# Patient Record
Sex: FEMALE | Race: AMERICAN INDIAN OR ALASKA NATIVE | NOT HISPANIC OR LATINO | URBAN - METROPOLITAN AREA
[De-identification: names, ages, dates, MRNs, and addresses within clinical notes are randomized per-mention and may not be internally consistent; named-entity substitution may affect disease eponyms.]

---

## 2017-01-20 ENCOUNTER — EMERGENCY (EMERGENCY)
Facility: HOSPITAL | Age: 22
LOS: 0 days | Discharge: HOME | End: 2017-01-20

## 2017-06-27 DIAGNOSIS — K29.70 GASTRITIS, UNSPECIFIED, WITHOUT BLEEDING: ICD-10-CM

## 2017-06-27 DIAGNOSIS — Z88.1 ALLERGY STATUS TO OTHER ANTIBIOTIC AGENTS STATUS: ICD-10-CM

## 2017-06-27 DIAGNOSIS — R10.84 GENERALIZED ABDOMINAL PAIN: ICD-10-CM

## 2017-06-27 DIAGNOSIS — Z88.0 ALLERGY STATUS TO PENICILLIN: ICD-10-CM

## 2019-03-25 ENCOUNTER — INPATIENT (INPATIENT)
Facility: HOSPITAL | Age: 24
LOS: 2 days | Discharge: HOME | End: 2019-03-28
Attending: PSYCHIATRY & NEUROLOGY | Admitting: PSYCHIATRY & NEUROLOGY

## 2019-03-25 VITALS
TEMPERATURE: 97 F | SYSTOLIC BLOOD PRESSURE: 138 MMHG | HEART RATE: 75 BPM | WEIGHT: 95.02 LBS | RESPIRATION RATE: 18 BRPM | DIASTOLIC BLOOD PRESSURE: 74 MMHG | OXYGEN SATURATION: 99 %

## 2019-03-25 DIAGNOSIS — R45.851 SUICIDAL IDEATIONS: ICD-10-CM

## 2019-03-25 DIAGNOSIS — F33.1 MAJOR DEPRESSIVE DISORDER, RECURRENT, MODERATE: ICD-10-CM

## 2019-03-25 DIAGNOSIS — F41.9 ANXIETY DISORDER, UNSPECIFIED: ICD-10-CM

## 2019-03-25 LAB
ALBUMIN SERPL ELPH-MCNC: 4.5 G/DL — SIGNIFICANT CHANGE UP (ref 3.5–5.2)
ALP SERPL-CCNC: 47 U/L — SIGNIFICANT CHANGE UP (ref 30–115)
ALT FLD-CCNC: 27 U/L — SIGNIFICANT CHANGE UP (ref 0–41)
AMPHET UR-MCNC: NEGATIVE — SIGNIFICANT CHANGE UP
ANION GAP SERPL CALC-SCNC: 13 MMOL/L — SIGNIFICANT CHANGE UP (ref 7–14)
APAP SERPL-MCNC: <5 UG/ML — LOW (ref 10–30)
AST SERPL-CCNC: 25 U/L — SIGNIFICANT CHANGE UP (ref 0–41)
BARBITURATES UR SCN-MCNC: NEGATIVE — SIGNIFICANT CHANGE UP
BASOPHILS # BLD AUTO: 0.05 K/UL — SIGNIFICANT CHANGE UP (ref 0–0.2)
BASOPHILS NFR BLD AUTO: 1 % — SIGNIFICANT CHANGE UP (ref 0–1)
BENZODIAZ UR-MCNC: NEGATIVE — SIGNIFICANT CHANGE UP
BILIRUB SERPL-MCNC: 0.3 MG/DL — SIGNIFICANT CHANGE UP (ref 0.2–1.2)
BUN SERPL-MCNC: 10 MG/DL — SIGNIFICANT CHANGE UP (ref 10–20)
CALCIUM SERPL-MCNC: 9.3 MG/DL — SIGNIFICANT CHANGE UP (ref 8.5–10.1)
CHLORIDE SERPL-SCNC: 101 MMOL/L — SIGNIFICANT CHANGE UP (ref 98–110)
CO2 SERPL-SCNC: 22 MMOL/L — SIGNIFICANT CHANGE UP (ref 17–32)
COCAINE METAB.OTHER UR-MCNC: NEGATIVE — SIGNIFICANT CHANGE UP
CREAT SERPL-MCNC: 0.6 MG/DL — LOW (ref 0.7–1.5)
EOSINOPHIL # BLD AUTO: 0.53 K/UL — SIGNIFICANT CHANGE UP (ref 0–0.7)
EOSINOPHIL NFR BLD AUTO: 11.1 % — HIGH (ref 0–8)
ETHANOL SERPL-MCNC: <10 MG/DL — HIGH
FLU A RESULT: NEGATIVE — SIGNIFICANT CHANGE UP
FLU A RESULT: NEGATIVE — SIGNIFICANT CHANGE UP
FLUAV AG NPH QL: NEGATIVE — SIGNIFICANT CHANGE UP
FLUBV AG NPH QL: NEGATIVE — SIGNIFICANT CHANGE UP
GLUCOSE SERPL-MCNC: 93 MG/DL — SIGNIFICANT CHANGE UP (ref 70–99)
HCG SERPL QL: NEGATIVE — SIGNIFICANT CHANGE UP
HCT VFR BLD CALC: 40.9 % — SIGNIFICANT CHANGE UP (ref 37–47)
HGB BLD-MCNC: 14.1 G/DL — SIGNIFICANT CHANGE UP (ref 12–16)
IMM GRANULOCYTES NFR BLD AUTO: 0.2 % — SIGNIFICANT CHANGE UP (ref 0.1–0.3)
LYMPHOCYTES # BLD AUTO: 2.26 K/UL — SIGNIFICANT CHANGE UP (ref 1.2–3.4)
LYMPHOCYTES # BLD AUTO: 47.2 % — SIGNIFICANT CHANGE UP (ref 20.5–51.1)
MCHC RBC-ENTMCNC: 31 PG — SIGNIFICANT CHANGE UP (ref 27–31)
MCHC RBC-ENTMCNC: 34.5 G/DL — SIGNIFICANT CHANGE UP (ref 32–37)
MCV RBC AUTO: 89.9 FL — SIGNIFICANT CHANGE UP (ref 81–99)
METHADONE UR-MCNC: NEGATIVE — SIGNIFICANT CHANGE UP
MONOCYTES # BLD AUTO: 0.55 K/UL — SIGNIFICANT CHANGE UP (ref 0.1–0.6)
MONOCYTES NFR BLD AUTO: 11.5 % — HIGH (ref 1.7–9.3)
NEUTROPHILS # BLD AUTO: 1.39 K/UL — LOW (ref 1.4–6.5)
NEUTROPHILS NFR BLD AUTO: 29 % — LOW (ref 42.2–75.2)
NRBC # BLD: 0 /100 WBCS — SIGNIFICANT CHANGE UP (ref 0–0)
OPIATES UR-MCNC: NEGATIVE — SIGNIFICANT CHANGE UP
PCP SPEC-MCNC: SIGNIFICANT CHANGE UP
PLATELET # BLD AUTO: 337 K/UL — SIGNIFICANT CHANGE UP (ref 130–400)
POTASSIUM SERPL-MCNC: 3.6 MMOL/L — SIGNIFICANT CHANGE UP (ref 3.5–5)
POTASSIUM SERPL-SCNC: 3.6 MMOL/L — SIGNIFICANT CHANGE UP (ref 3.5–5)
PROPOXYPHENE QUALITATIVE URINE RESULT: NEGATIVE — SIGNIFICANT CHANGE UP
PROT SERPL-MCNC: 7.5 G/DL — SIGNIFICANT CHANGE UP (ref 6–8)
RBC # BLD: 4.55 M/UL — SIGNIFICANT CHANGE UP (ref 4.2–5.4)
RBC # FLD: 11.9 % — SIGNIFICANT CHANGE UP (ref 11.5–14.5)
RSV RESULT: NEGATIVE — SIGNIFICANT CHANGE UP
RSV RNA RESP QL NAA+PROBE: NEGATIVE — SIGNIFICANT CHANGE UP
SALICYLATES SERPL-MCNC: <0.3 MG/DL — LOW (ref 4–30)
SODIUM SERPL-SCNC: 136 MMOL/L — SIGNIFICANT CHANGE UP (ref 135–146)
WBC # BLD: 4.79 K/UL — LOW (ref 4.8–10.8)
WBC # FLD AUTO: 4.79 K/UL — LOW (ref 4.8–10.8)

## 2019-03-25 RX ORDER — BENZOCAINE AND MENTHOL 5; 1 G/100ML; G/100ML
1 LIQUID ORAL THREE TIMES A DAY
Qty: 0 | Refills: 0 | Status: DISCONTINUED | OUTPATIENT
Start: 2019-03-25 | End: 2019-03-28

## 2019-03-25 RX ORDER — IBUPROFEN 200 MG
400 TABLET ORAL THREE TIMES A DAY
Qty: 0 | Refills: 0 | Status: DISCONTINUED | OUTPATIENT
Start: 2019-03-25 | End: 2019-03-28

## 2019-03-25 RX ORDER — ACETAMINOPHEN 500 MG
650 TABLET ORAL EVERY 6 HOURS
Qty: 0 | Refills: 0 | Status: DISCONTINUED | OUTPATIENT
Start: 2019-03-25 | End: 2019-03-28

## 2019-03-25 RX ORDER — LANOLIN ALCOHOL/MO/W.PET/CERES
3 CREAM (GRAM) TOPICAL AT BEDTIME
Qty: 0 | Refills: 0 | Status: DISCONTINUED | OUTPATIENT
Start: 2019-03-25 | End: 2019-03-28

## 2019-03-25 RX ORDER — HYDROXYZINE HCL 10 MG
25 TABLET ORAL EVERY 6 HOURS
Qty: 0 | Refills: 0 | Status: DISCONTINUED | OUTPATIENT
Start: 2019-03-25 | End: 2019-03-28

## 2019-03-25 RX ORDER — BENZOCAINE AND MENTHOL 5; 1 G/100ML; G/100ML
1 LIQUID ORAL ONCE
Qty: 0 | Refills: 0 | Status: COMPLETED | OUTPATIENT
Start: 2019-03-25 | End: 2019-03-25

## 2019-03-25 RX ADMIN — Medication 200 MILLIGRAM(S): at 03:48

## 2019-03-25 RX ADMIN — Medication 3 MILLIGRAM(S): at 21:08

## 2019-03-25 RX ADMIN — Medication 25 MILLIGRAM(S): at 21:08

## 2019-03-25 RX ADMIN — BENZOCAINE AND MENTHOL 1 LOZENGE: 5; 1 LIQUID ORAL at 16:07

## 2019-03-25 RX ADMIN — BENZOCAINE AND MENTHOL 1 LOZENGE: 5; 1 LIQUID ORAL at 08:54

## 2019-03-25 NOTE — ED BEHAVIORAL HEALTH ASSESSMENT NOTE - OTHER
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

## 2019-03-25 NOTE — ED PROVIDER NOTE - CLINICAL SUMMARY MEDICAL DECISION MAKING FREE TEXT BOX
pt with no significant PMH here with SI, this is a recurrence of sx. pt seen by psychaitry and admitted after medical clearance
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
## 2019-03-25 NOTE — ED BEHAVIORAL HEALTH ASSESSMENT NOTE - DESCRIPTION
Patient placed on 1:1, evaluated by ED provider. Friend at bedside. none raised by parents with 2 sisters - no relationship with any, domiciled alone in home owned by parents who visit occasionally. close with friends. laid off from work in [employer] design 3 mo ago. 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

## 2019-03-25 NOTE — ED BEHAVIORAL HEALTH ASSESSMENT NOTE - RISK ASSESSMENT
Patient's suicide risk factors of multiple suicide attempts, recent suicidal ideation, feelings of guilt, living alone, and no current treatment is not currently mitigated by her protective factors. Patient is currently an elevated suicide risk, appropriate for inpatient management for stabilization.

## 2019-03-25 NOTE — ED BEHAVIORAL HEALTH ASSESSMENT NOTE - OTHER PAST PSYCHIATRIC HISTORY (INCLUDE DETAILS REGARDING ONSET, COURSE OF ILLNESS, INPATIENT/OUTPATIENT TREATMENT)
3prior IPP admissions, last 3 years ago following suicide attempt. patient diagnosed 4-5 years ago with PTSD, DIAZ, MDD. Did not continue treatment after moving back to [place] because she reports that the environment was better. No current outpatient treatment.

## 2019-03-25 NOTE — H&P ADULT - NSHPPHYSICALEXAM_GEN_ALL_CORE
GENERAL:  22y/o Female NAD, resting comfortably.  HEAD:  Atraumatic, Normocephalic  EYES: EOMI, PERRLA, conjunctiva and sclera clear  NECK: Supple, No JVD, no cervical lymphadenopathy, non-tender  CHEST/LUNG: Clear to auscultation bilaterally; No wheeze, rhonchi, or rales  HEART: Regular rate and rhythm; S1&S2  ABDOMEN: Soft, Nontender, Nondistended x 4 quadrants; Bowel sounds present  EXTREMITIES:   Peripheral Pulses Present, No clubbing, no cyanosis, or no edema, no calf tenderness  PSYCH: AAOx3, cooperative, appropriate  NEUROLOGY: WNL  SKIN: WNL

## 2019-03-25 NOTE — ED PROVIDER NOTE - NS ED ROS FT
Constitutional:  + fevers, no chills, no malaise  Eyes:  No visual changes  ENMT:see hpi  Cardiac:  No chest pain  Respiratory:  No cough or sob  GI:  No nausea, vomiting, diarrhea or abdominal pain.  :  No dysuria, frequency or burning.  MS:  No back pain, no joint pain.  Neuro:  No headache, no dizziness, no change in mental status  Skin:  No skin rash  Except as documented in the HPI,  all other systems are negative
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
## 2019-03-25 NOTE — ED BEHAVIORAL HEALTH ASSESSMENT NOTE - CASE SUMMARY
23 year old woman with a prior pscyhiatric history notable for prior acts of self-harm and multiple past diagnoses with unclear psychopharm history who presents with recent suicidal ideation and though she lacks current intent, the patient reports not feeling that she is safe given her history on impulsively acting on such thoughts.  The patient would benefit from 9.13 admission for safety and treatment planning.  she is currently not in treatment.

## 2019-03-25 NOTE — ED BEHAVIORAL HEALTH ASSESSMENT NOTE - SUMMARY
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 Patient presenting for worsening suicidal ideation in the context of no current mental health treatment and multiple psychosocial stressors - interpersonal relationships, unemployment, traumatic hx.  On exam, patient appears to exhibit signs consistent with a major depressive episode with suicidal ideation, guilt, and circumstantial thought process. It's unclear if history of irritability is associated with bpad or the result of maladaptive coping during an adjustment episode. She appears to be a chronic risk for self-injurious/suicidal behavior, with acute elevation in the setting of the above stressors and is unable to contract for safety at this time. Her current presentation is appropriate for voluntary IPP admission where she will likely benefit from continued observation, psychoeducation and a medication trial. She will also likely be a good candidate for the crisis survival skills group and subsequent DBT following discharge.
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
## 2019-03-25 NOTE — ED ADULT TRIAGE NOTE - CHIEF COMPLAINT QUOTE
pt states she is having suicidal ideations, she wants to hang herself and does not feel safe at home alone  pt denies any drug/alcohol use  1:1 care initiated in triage
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
## 2019-03-25 NOTE — ED ADULT NURSE NOTE - NSIMPLEMENTINTERV_GEN_ALL_ED
Implemented All Universal Safety Interventions:  [place] to call system. Call bell, personal items and telephone within reach. Instruct patient to call for assistance. Room bathroom lighting operational. Non-slip footwear when patient is off stretcher. Physically safe environment: no spills, clutter or unnecessary equipment. Stretcher in lowest position, wheels locked, appropriate side rails in place.

## 2019-03-25 NOTE — H&P ADULT - ASSESSMENT
)	24yo single female, domiciled, and works for commission as performer and  with current URI and reported pph of DIAZ, PTSD, and MDD with 14 reported suicide attempts, most recent 3 years ago, and no current substance use. Patient presenting for worsening suicidal ideation in the context of no current mental health treatment and social stressors related to friends.  Patient reports that her mental health has been well since her last suicide attempt, after returning to [place] from [place] where she endured trauma. She reports that 5 months ago she began to provide substantial emotional support for a friend experiencing interpersonal turmoil and subsequently became less motivated to care for herself over the past 3 months. She also endorses decreased concentration and energy and feeling of guilt about her lack of self care. She reports that she started having fleeting suicidal ideation 1 week ago, typically able to distract herself with humor and then yesterday her suicidal ideation lasted for hours with strong flashbacks to prior suicide attempts via hanging (failed) and jumping off bridge (interrupted). She states that she does not trust herself at the moment and is fearful that she may harm herself if she returns home today. Patient reports h/o persistent irritability with violent behavior 4-5 years ago, no h/o legal consequences. She also reports experiencing auditory command hallucinations around that time to harm herself. She denies any recent elated or irritable mood or any recent symptoms of psychosis.  Collateral reports that patient often makes statement like "I want to die", but it is not typically concerning. He reports that he has been more concerned lately that she may do something to harm herself, saying that last night she told him that she was suicidal and stated "I think I need to be admitted". He checks in with her regularly and reports that he will continue if she is released.

## 2019-03-25 NOTE — H&P ADULT - NSHPLABSRESULTS_GEN_ALL_CORE
Vital Signs Last 24 Hrs  T(C): 36.5 (25 Mar 2019 18:55), Max: 36.7 (25 Mar 2019 14:33)  T(F): 97.7 (25 Mar 2019 18:55), Max: 98 (25 Mar 2019 14:33)  HR: 80 (25 Mar 2019 15:54) (75 - 95)  BP: 117/79 (25 Mar 2019 15:54) (105/65 - 138/74)  BP(mean): --  RR: 16 (25 Mar 2019 18:55) (16 - 18)  SpO2: 100% (25 Mar 2019 15:54) (99% - 100%)                        14.1   4.79  )-----------( 337      ( 25 Mar 2019 01:28 )             40.9       03-25    136  |  101  |  10  ----------------------------<  93  3.6   |  22  |  0.6<L>    Ca    9.3      25 Mar 2019 01:28    TPro  7.5  /  Alb  4.5  /  TBili  0.3  /  DBili  x   /  AST  25  /  ALT  27  /  AlkPhos  47  03-25                      Lactate Trend            CAPILLARY BLOOD GLUCOSE

## 2019-03-25 NOTE — ED BEHAVIORAL HEALTH ASSESSMENT NOTE - PSYCHIATRIC ISSUES AND PLAN (INCLUDE STANDING AND PRN MEDICATION)
Depression - lexapro 10mg daily Depression - consider SSRI, Anxiety - Vistaril 25mg q6hr PRN, Insomnia - Melatonin 3mg qhs PRN

## 2019-03-25 NOTE — ED PROVIDER NOTE - PROGRESS NOTE DETAILS
case d/w psych, will evaluate. Pt endorsed to Dr. Ray to f/u with psych eval. pt seen by me and results reviewed. Psychiatry consult pending. Pt also asking for a cough drop psych resident has seen the pt will  dw attending pt will be admitted per psychiatry

## 2019-03-25 NOTE — ED PROVIDER NOTE - OBJECTIVE STATEMENT
24 y/o female with h/o PTSD, depression, prior suicide attempts, in ER with c/o feeling depressed and having suicidal ideations for past few weeks.  Pt states she's been taking care of a friend who's been very depressed  and her own depression has resurfaced.  She's having thoughts of wanting to hurt herself and is concerned for her safety - having thoughts of hanging herself, jumping off bridge.  Denies any etoh or drug use.  Lives by herself, currently unemployed.  Not following with psych right now, not on any meds.  Also c/o URI sx's for the past 2 days - fever at home with nasal congestion, rhinorrhea, non-productive cough.   No cp/sob.  no ha/dizziness/loc.  no abd pain. no other complaints.

## 2019-03-26 DIAGNOSIS — F60.3 BORDERLINE PERSONALITY DISORDER: ICD-10-CM

## 2019-03-26 LAB
CHOLEST SERPL-MCNC: 157 MG/DL — SIGNIFICANT CHANGE UP (ref 100–200)
ESTIMATED AVERAGE GLUCOSE: 103 MG/DL — SIGNIFICANT CHANGE UP (ref 68–114)
HBA1C BLD-MCNC: 5.2 % — SIGNIFICANT CHANGE UP (ref 4–5.6)
HDLC SERPL-MCNC: 68 MG/DL — SIGNIFICANT CHANGE UP
LIPID PNL WITH DIRECT LDL SERPL: 73 MG/DL — SIGNIFICANT CHANGE UP (ref 4–129)
TOTAL CHOLESTEROL/HDL RATIO MEASUREMENT: 2.3 RATIO — LOW (ref 4–5.5)
TRIGL SERPL-MCNC: 119 MG/DL — SIGNIFICANT CHANGE UP (ref 10–149)

## 2019-03-26 NOTE — CHART NOTE - NSCHARTNOTEFT_GEN_A_CORE
Pt. is a 23 year old, single  female who was admitted due to suicidal ideations. "I don't feel safe with myself".   Pt. has a history of about 14 prior suicide attempts.  Some of these attempts reportedly including attempts to hang herself and [place].  Pt. reported having flashbacks of prior suicide attempts which she states increased her existing anxiety and panic attacks.  Pt. reports having about 3-4 prior IPP admissions.  She reports her last suicide attempt, which was her attempt to [place], was about 3 years ago.  Pt. states she was hospitalized at [facility] after police found her on the bridge.  In addition, pt. reports 2 other hospitalization at [facility] while living in [place] and states she was unsure of her other hospitalization.  Pt. also discussed how assisting and strongly focusing on a friend's personal issues led to her neglecting her own mental health and an increase in mental health symptoms.  Pt. currently resides alone in a home per parents own.  She reports her parents "visit occasionally" but do not reside nearby.  Pt. reports she is currently unemployed and is actively seeking employment.  Pt. denies any history of alcohol or substance abuse.  Writer will attempt to contact pt.'s mother, Rosa Brooks, for collateral information.  Pt. is not psychiatrically stable for discharge at this time.

## 2019-03-27 LAB — TSH SERPL-MCNC: 1.46 UIU/ML — SIGNIFICANT CHANGE UP (ref 0.27–4.2)

## 2019-03-27 RX ADMIN — Medication 100 MILLIGRAM(S): at 12:16

## 2019-03-27 RX ADMIN — Medication 100 MILLIGRAM(S): at 21:04

## 2019-03-27 RX ADMIN — BENZOCAINE AND MENTHOL 1 LOZENGE: 5; 1 LIQUID ORAL at 15:04

## 2019-03-27 NOTE — CONSULT NOTE ADULT - SUBJECTIVE AND OBJECTIVE BOX
PETAR QUEVEDO  23y  Female      Patient is a 23y old  Female who presents with a chief complaint of suicidal (25 Mar 2019 20:57)        PAST MEDICAL/SURGICAL HISTORY  PAST MEDICAL & SURGICAL HISTORY:  Anxiety and depression      REVIEW OF SYSTEMS:  CONSTITUTIONAL: No fever, weight loss, or fatigue  EYES: No eye pain, visual disturbances, or discharge  ENMT:  No difficulty hearing, tinnitus, vertigo; No sinus or throat pain  NECK: No pain or stiffness  RESPIRATORY: No cough, wheezing, chills or hemoptysis; No shortness of breath  CARDIOVASCULAR: No chest pain, palpitations, dizziness, or leg swelling  GASTROINTESTINAL: No abdominal or epigastric pain. No nausea, vomiting, or hematemesis; No diarrhea or constipation. No melena or hematochezia.  GENITOURINARY: No dysuria, frequency, hematuria, or incontinence    ALLERY AND IMMUNOLOGIC: No hives or eczema    acetaminophen   Tablet .. 650 milliGRAM(s) Oral every 6 hours PRN  benzocaine 15 mG/menthol 3.6 mG Lozenge 1 Lozenge Oral three times a day PRN  guaiFENesin    Syrup 100 milliGRAM(s) Oral every 6 hours PRN  hydrOXYzine hydrochloride 25 milliGRAM(s) Oral every 6 hours PRN  ibuprofen  Tablet. 400 milliGRAM(s) Oral three times a day PRN  LORazepam     Tablet 1 milliGRAM(s) Oral every 6 hours PRN  melatonin 3 milliGRAM(s) Oral at bedtime PRN      T(C): 35.9 (03-27-19 @ 09:16), Max: 35.9 (03-27-19 @ 09:16)  HR: 113 (03-27-19 @ 09:16) (69 - 113)  BP: 106/66 (03-27-19 @ 09:16) (104/76 - 113/82)  RR: 18 (03-27-19 @ 09:16) (16 - 18)  SpO2: --  Wt(kg): --Vital Signs Last 24 Hrs  T(C): 35.9 (27 Mar 2019 09:16), Max: 35.9 (27 Mar 2019 09:16)  T(F): 96.6 (27 Mar 2019 09:16), Max: 96.6 (27 Mar 2019 09:16)  HR: 113 (27 Mar 2019 09:16) (69 - 113)  BP: 106/66 (27 Mar 2019 09:16) (104/76 - 113/82)  BP(mean): --  RR: 18 (27 Mar 2019 09:16) (16 - 18)  SpO2: --    PHYSICAL EXAM:  GENERAL: NAD, well-groomed, well-developed  HEAD:  Atraumatic, Normocephalic  EYES: EOMI, PERRLA, conjunctiva and sclera clear  ENMT: No tonsillar erythema, exudates, or enlargement; Moist mucous membranes, Good dentition, No lesions  NECK: Supple, No JVD, Normal thyroid  NERVOUS SYSTEM:  Alert & Oriented X3, Good concentration; Motor Strength 5/5 B/L upper and lower extremities; DTRs 2+ intact and symmetric  CHEST/LUNG: Clear to percussion bilaterally; No rales, rhonchi, wheezing, or rubs  HEART: Regular rate and rhythm; No murmurs, rubs, or gallops  ABDOMEN: Soft, Nontender, Nondistended; Bowel sounds present  EXTREMITIES:  2+ Peripheral Pulses, No clubbing, cyanosis, or edema  LYMPH: No lymphadenopathy noted  SKIN: No rashes or lesions      LABS:  CBC       BMP  03-25-19 @ 01:28  Anion Gap. Serum 13  Blood Urea Nitrogen,Serm 10  Calcium, Total Serum 9.3  Carbon Dioxide, Serum 22  Chloride, Serum 101  Creatinine, Serum 0.6  eGFR in African American 150  eGFR in Non Afican American 129  Gloucose, serum 93  Potassium, Serum 3.6  Sodium, Serum 136                  CMP  03-25-19 @ 01:28  Kristie Aminotransferase(ALT/SGPT)27  Albumin, Serum 4.5  Alkaline Phosphatase, Serum 47  Anion Gap, Serum 13  Aspartate Aminotransferase (AST/SGOT)25  Bilirubin Total, Serum 0.3  Blood Urea Nitrogen, Serum 10  Calcium,Total Serum 9.3  Carbon Dioxide, Serum 22  Chloride, Serum 101  Creatinine, Serum 0.6  eGFR if  150  eGFR if Non African American 129  Glucose, Serum 93  Potassium, Serum 3.6  Protein Total, Serum 7.5  Sodium, Serum 136                          PT/INR      Amylase/Lipase            RADIOLOGY & ADDITIONAL TESTS:    Imaging Personally Reviewed:  [ ] YES  [ ] NO

## 2019-03-27 NOTE — CHART NOTE - NSCHARTNOTEFT_GEN_A_CORE
Pt. reports she is feeling much better and much clearer.  She is denying any suicidal ideations at this time.  Additionally, she states she no longer feels she needs to remain in the hospital.  Pt. has an anticipated discharge date of 3/28 [facility]  On 3/26, writer met in person, with pt.'s mother, Rosa Brooks.  Writer provided pt.'s mother with a progress report and an anticipated discharge plan.  Pt. has remained compliant with her treatment and has been active and present on the unit.  No other issues or concerns have been reported.  Pt. is not yet stable for discharge on this date.

## 2019-03-27 NOTE — DISCHARGE NOTE BEHAVIORAL HEALTH - HPI (INCLUDE ILLNESS QUALITY, SEVERITY, DURATION, TIMING, CONTEXT, MODIFYING FACTORS, ASSOCIATED SIGNS AND SYMPTOMS)
22 y/o female with prior inpt psych elsewhere, last time 3 yrs ago, with prior suicidal ideation and attempt(s), presented to ED with suicidal ideation. Pt reports that she was too involved with her friends and their problems, and so it made her feel depressed and suicidal like they were. Pt reports no depressive sx prior to these recent encounters.    Pt was admitted voluntary status, stated she wanted to "rest a little" and to make certain that she was "safe'.    No drugs or alcohol hx reported.    States she tried to hang self 3 yrs ago and was once planning to jump off [employer], but was spotted by [facility] and brought to ED.    Pt reports prior tx with xanax and risperdal; not currentl;y in any treatment, not working full time.    Pt spontaneously improved, and did not want to take any medication. None  was indicated, and pt was motivated to resume therapy as outpt.    No s/h ideation on d/c. No psychosis was ever noted during this admission

## 2019-03-27 NOTE — DISCHARGE NOTE BEHAVIORAL HEALTH - NSBHDCSWCOMMENTSFT_PSY_A_CORE
Discharge information will be faxed to the next level of care-[id number]-[phone], on 3/28/19 at 12 pm

## 2019-03-27 NOTE — DISCHARGE NOTE BEHAVIORAL HEALTH - NSBHDCADDR1FT_A_CORE
[address] [facility] [address] [address] [place] [address]    Please bring photo ID

## 2019-03-27 NOTE — CONSULT NOTE ADULT - ASSESSMENT
Imp psych check b12 folate tsh  psych to follow up    moniter for signs/symptoms of constipation and then consider adding mom prn

## 2019-03-28 ENCOUNTER — TRANSCRIPTION ENCOUNTER (OUTPATIENT)
Age: 24
End: 2019-03-28

## 2019-03-28 VITALS
SYSTOLIC BLOOD PRESSURE: 107 MMHG | DIASTOLIC BLOOD PRESSURE: 66 MMHG | RESPIRATION RATE: 16 BRPM | TEMPERATURE: 97 F | HEART RATE: 101 BPM

## 2019-03-28 RX ADMIN — Medication 100 MILLIGRAM(S): at 08:23

## 2019-03-28 NOTE — CHART NOTE - NSCHARTNOTEFT_GEN_A_CORE
Pt. is scheduled to be discharged on this date.  She reports she is feeling much better since her admission.  Pt. is denying any suicidal or homicidal ideations.  She is also denying any A/V hallucinations.  Pt. verbalizes feelings of hopefulness and has identified goals for the future.  Pt.'s mother was made aware of her discharge.  However, pt. will be escorted home by a friend as her mother reportedly does not drive.  Pt. is scheduled for outpatient mental health services at [facility] with an appointment on 4/4 at 9:15 am.  As per the attending psychiatrist, pt. is stable for discharge on this date.

## 2019-03-28 NOTE — DISCHARGE NOTE NURSING/CASE MANAGEMENT/SOCIAL WORK - NSDCDPATPORTLINK_GEN_ALL_CORE
You can access the [facility] Patient Portal, offered by [facility], by registering with the following website: [email or web address]

## 2019-04-02 DIAGNOSIS — R45.851 SUICIDAL IDEATIONS: ICD-10-CM

## 2019-04-02 DIAGNOSIS — R44.0 AUDITORY HALLUCINATIONS: ICD-10-CM

## 2019-04-02 DIAGNOSIS — Z88.2 ALLERGY STATUS TO SULFONAMIDES: ICD-10-CM

## 2019-04-02 DIAGNOSIS — Z56.0 UNEMPLOYMENT, UNSPECIFIED: ICD-10-CM

## 2019-04-02 DIAGNOSIS — Z73.3 STRESS, NOT ELSEWHERE CLASSIFIED: ICD-10-CM

## 2019-04-02 DIAGNOSIS — F60.3 BORDERLINE PERSONALITY DISORDER: ICD-10-CM

## 2019-04-02 DIAGNOSIS — F41.1 GENERALIZED ANXIETY DISORDER: ICD-10-CM

## 2019-04-02 DIAGNOSIS — Z88.0 ALLERGY STATUS TO PENICILLIN: ICD-10-CM

## 2019-04-02 DIAGNOSIS — F43.0 ACUTE STRESS REACTION: ICD-10-CM

## 2019-04-02 DIAGNOSIS — K59.00 CONSTIPATION, UNSPECIFIED: ICD-10-CM

## 2019-04-02 DIAGNOSIS — F32.9 MAJOR DEPRESSIVE DISORDER, SINGLE EPISODE, UNSPECIFIED: ICD-10-CM

## 2019-04-02 DIAGNOSIS — G47.00 INSOMNIA, UNSPECIFIED: ICD-10-CM

## 2019-04-02 DIAGNOSIS — Z91.5 PERSONAL HISTORY OF SELF-HARM: ICD-10-CM

## 2019-04-02 DIAGNOSIS — Z62.810 PERSONAL HISTORY OF PHYSICAL AND SEXUAL ABUSE IN CHILDHOOD: ICD-10-CM

## 2019-04-02 PROBLEM — F41.9 ANXIETY DISORDER, UNSPECIFIED: Chronic | Status: ACTIVE | Noted: 2019-03-25

## 2019-04-02 SDOH — ECONOMIC STABILITY - INCOME SECURITY: UNEMPLOYMENT, UNSPECIFIED: Z56.0

## 2019-04-04 ENCOUNTER — OUTPATIENT (OUTPATIENT)
Dept: OUTPATIENT SERVICES | Facility: HOSPITAL | Age: 24
LOS: 1 days | Discharge: HOME | End: 2019-04-04

## 2019-04-17 ENCOUNTER — OUTPATIENT (OUTPATIENT)
Dept: OUTPATIENT SERVICES | Facility: HOSPITAL | Age: 24
LOS: 1 days | Discharge: HOME | End: 2019-04-17

## 2019-04-17 DIAGNOSIS — F33.1 MAJOR DEPRESSIVE DISORDER, RECURRENT, MODERATE: ICD-10-CM

## 2019-05-17 NOTE — ED PROVIDER NOTE - PHYSICAL EXAMINATION
CONSTITUTIONAL: Well-developed; well-nourished; in no acute distress, nontoxic appearing  SKIN: skin exam is warm and dry,  HEAD: Normocephalic; atraumatic.  EYES: PERRL, 3 mm bilateral, no nystagmus, EOM intact; conjunctiva and sclera clear.  ENT: MMM, + nasal congestion  NECK: Supple; non tender.+ full passive ROM in all directions. No JVD  CARD: S1, S2 normal, no murmur  RESP: No wheezes, rales or rhonchi. Good air movement bilaterally  ABD: soft; non-distended; non-tender. No Rebound, No guarding  EXT: Normal ROM. No cyanosis or edema. Dp Pulses intact.   NEURO: awake, alert, following commands, oriented, grossly unremarkable. No Focal deficits. GCS 15.   PSYCH: Cooperative, appropriate. good, to achieve stated therapy goals

## 2019-05-21 ENCOUNTER — OUTPATIENT (OUTPATIENT)
Dept: OUTPATIENT SERVICES | Facility: HOSPITAL | Age: 24
LOS: 1 days | Discharge: HOME | End: 2019-05-21

## 2019-05-21 DIAGNOSIS — F33.1 MAJOR DEPRESSIVE DISORDER, RECURRENT, MODERATE: ICD-10-CM

## 2019-05-29 ENCOUNTER — OUTPATIENT (OUTPATIENT)
Dept: OUTPATIENT SERVICES | Facility: HOSPITAL | Age: 24
LOS: 1 days | Discharge: HOME | End: 2019-05-29

## 2019-05-29 DIAGNOSIS — F33.1 MAJOR DEPRESSIVE DISORDER, RECURRENT, MODERATE: ICD-10-CM

## 2019-06-14 ENCOUNTER — OUTPATIENT (OUTPATIENT)
Dept: OUTPATIENT SERVICES | Facility: HOSPITAL | Age: 24
LOS: 1 days | Discharge: HOME | End: 2019-06-14

## 2019-06-14 DIAGNOSIS — F33.1 MAJOR DEPRESSIVE DISORDER, RECURRENT, MODERATE: ICD-10-CM

## 2020-10-06 NOTE — ED PROVIDER NOTE - CONSULTANT FREE TEXT FOR MDM DISCUSSED CASE WITH QUESTION
[facility] Xolair Counseling:  Patient informed of potential adverse effects including but not limited to fever, muscle aches, rash and allergic reactions.  The patient verbalized understanding of the proper use and possible adverse effects of Xolair.  All of the patient's questions and concerns were addressed.

## 2020-12-28 ENCOUNTER — TRANSCRIPTION ENCOUNTER (OUTPATIENT)
Age: 25
End: 2020-12-28

## 2021-10-26 NOTE — ED ADULT NURSE NOTE - NSSUSCREENINGQ2_ED_ALL_ED
November 1, 2021      Gayle Moya  [id number] [id number]        Dear ,    We are writing to inform you of your test results.    Labs in acceptable range.       Resulted Orders   Lipid panel reflex to direct LDL Fasting   Result Value Ref Range    Cholesterol 179 <200 mg/dL    Triglycerides 81 <150 mg/dL    Direct Measure HDL 60 >=50 mg/dL    LDL Cholesterol Calculated 103 (H) <=100 mg/dL    Non HDL Cholesterol 119 <130 mg/dL    Patient Fasting > 8hrs? Yes     Narrative    Cholesterol  Desirable:  <200 mg/dL    Triglycerides  Normal:  Less than 150 mg/dL  Borderline High:  150-199 mg/dL  High:  200-499 mg/dL  Very High:  Greater than or equal to 500 mg/dL    Direct Measure HDL  Female:  Greater than or equal to 50 mg/dL   Male:  Greater than or equal to 40 mg/dL    LDL Cholesterol  Desirable:  <100mg/dL  Above Desirable:  100-129 mg/dL   Borderline High:  130-159 mg/dL   High:  160-189 mg/dL   Very High:  >= 190 mg/dL    Non HDL Cholesterol  Desirable:  130 mg/dL  Above Desirable:  130-159 mg/dL  Borderline High:  160-189 mg/dL  High:  190-219 mg/dL  Very High:  Greater than or equal to 220 mg/dL   Comprehensive metabolic panel (BMP + Alb, Alk Phos, ALT, AST, Total. Bili, TP)   Result Value Ref Range    Sodium 138 133 - 144 mmol/L    Potassium 4.5 3.4 - 5.3 mmol/L    Chloride 109 94 - 109 mmol/L    Carbon Dioxide (CO2) 27 20 - 32 mmol/L    Anion Gap 2 (L) 3 - 14 mmol/L    Urea Nitrogen 11 7 - 30 mg/dL    Creatinine 0.64 0.52 - 1.04 mg/dL    Calcium 8.8 8.5 - 10.1 mg/dL    Glucose 86 70 - 99 mg/dL    Alkaline Phosphatase 57 40 - 150 U/L    AST 8 0 - 45 U/L    ALT 25 0 - 50 U/L    Protein Total 7.2 6.8 - 8.8 g/dL    Albumin 3.8 3.4 - 5.0 g/dL    Bilirubin Total 0.3 0.2 - 1.3 mg/dL    GFR Estimate >90 >60 mL/min/1.73m2      Comment:      As of July 11, 2021, eGFR is calculated by the CKD-EPI creatinine equation, without race adjustment. eGFR can be influenced by muscle mass,  exercise, and diet. The reported eGFR is an estimation only and is only applicable if the renal function is stable.   TSH with free T4 reflex   Result Value Ref Range    TSH 1.29 0.40 - 4.00 mU/L   Hepatitis C Screen Reflex to HCV RNA Quant and Genotype   Result Value Ref Range    Hepatitis C Antibody Nonreactive Nonreactive    Narrative    Assay performance characteristics have not been established for newborns, infants, and children.   CBC with platelets and differential   Result Value Ref Range    WBC Count 5.6 4.0 - 11.0 10e3/uL    RBC Count 4.44 3.80 - 5.20 10e6/uL    Hemoglobin 12.9 11.7 - 15.7 g/dL    Hematocrit 40.0 35.0 - 47.0 %    MCV 90 78 - 100 fL    MCH 29.1 26.5 - 33.0 pg    MCHC 32.3 31.5 - 36.5 g/dL    RDW 13.1 10.0 - 15.0 %    Platelet Count 204 150 - 450 10e3/uL    % Neutrophils 60 %    % Lymphocytes 31 %    % Monocytes 6 %    % Eosinophils 3 %    % Basophils 1 %    Absolute Neutrophils 3.3 1.6 - 8.3 10e3/uL    Absolute Lymphocytes 1.7 0.8 - 5.3 10e3/uL    Absolute Monocytes 0.3 0.0 - 1.3 10e3/uL    Absolute Eosinophils 0.2 0.0 - 0.7 10e3/uL    Absolute Basophils 0.0 0.0 - 0.2 10e3/uL       If you have any questions or concerns, please call the clinic at the number listed above.       Sincerely,      PETRA Fabian CNP           Yes

## 2022-10-10 NOTE — PATIENT PROFILE BEHAVIORAL HEALTH - NSBHBEHAVIOR_PSY_A_CORE
----- Message from Hugo Moran MD sent at 10/6/2022  8:12 AM CDT -----  The alpha-1 antitrypsin phenotype breakdown is predominantly normal with very small amounts of MS.  This will probably never correlate to clinical disease.  Apparently, MZ is the bad 1.  
appropriate for situation

## 2023-04-28 ENCOUNTER — EMERGENCY (EMERGENCY)
Facility: HOSPITAL | Age: 28
LOS: 0 days | Discharge: HOME | End: 2023-04-28
Attending: EMERGENCY MEDICINE
Payer: MEDICAID

## 2023-04-28 VITALS
RESPIRATION RATE: 21 BRPM | HEART RATE: 113 BPM | WEIGHT: 95.02 LBS | TEMPERATURE: 97 F | SYSTOLIC BLOOD PRESSURE: 122 MMHG | OXYGEN SATURATION: 98 % | DIASTOLIC BLOOD PRESSURE: 79 MMHG

## 2023-04-28 VITALS — HEART RATE: 74 BPM | SYSTOLIC BLOOD PRESSURE: 116 MMHG | TEMPERATURE: 98 F | DIASTOLIC BLOOD PRESSURE: 79 MMHG

## 2023-04-28 DIAGNOSIS — F43.10 POST-TRAUMATIC STRESS DISORDER, UNSPECIFIED: ICD-10-CM

## 2023-04-28 DIAGNOSIS — F41.8 OTHER SPECIFIED ANXIETY DISORDERS: ICD-10-CM

## 2023-04-28 DIAGNOSIS — F60.3 BORDERLINE PERSONALITY DISORDER: ICD-10-CM

## 2023-04-28 DIAGNOSIS — Z91.51 PERSONAL HISTORY OF SUICIDAL BEHAVIOR: ICD-10-CM

## 2023-04-28 DIAGNOSIS — Z88.0 ALLERGY STATUS TO PENICILLIN: ICD-10-CM

## 2023-04-28 DIAGNOSIS — Z56.0 UNEMPLOYMENT, UNSPECIFIED: ICD-10-CM

## 2023-04-28 DIAGNOSIS — Z20.822 CONTACT WITH AND (SUSPECTED) EXPOSURE TO COVID-19: ICD-10-CM

## 2023-04-28 DIAGNOSIS — R45.851 SUICIDAL IDEATIONS: ICD-10-CM

## 2023-04-28 LAB
ALBUMIN SERPL ELPH-MCNC: 4.3 G/DL — SIGNIFICANT CHANGE UP (ref 3.5–5.2)
ALP SERPL-CCNC: 27 U/L — LOW (ref 30–115)
ALT FLD-CCNC: 23 U/L — SIGNIFICANT CHANGE UP (ref 0–41)
AMPHET UR-MCNC: NEGATIVE — SIGNIFICANT CHANGE UP
ANION GAP SERPL CALC-SCNC: 10 MMOL/L — SIGNIFICANT CHANGE UP (ref 7–14)
APAP SERPL-MCNC: <5 UG/ML — LOW (ref 10–30)
AST SERPL-CCNC: 23 U/L — SIGNIFICANT CHANGE UP (ref 0–41)
BARBITURATES UR SCN-MCNC: NEGATIVE — SIGNIFICANT CHANGE UP
BASOPHILS # BLD AUTO: 0.05 K/UL — SIGNIFICANT CHANGE UP (ref 0–0.2)
BASOPHILS NFR BLD AUTO: 0.9 % — SIGNIFICANT CHANGE UP (ref 0–1)
BENZODIAZ UR-MCNC: NEGATIVE — SIGNIFICANT CHANGE UP
BILIRUB SERPL-MCNC: 0.3 MG/DL — SIGNIFICANT CHANGE UP (ref 0.2–1.2)
BUN SERPL-MCNC: 12 MG/DL — SIGNIFICANT CHANGE UP (ref 10–20)
CALCIUM SERPL-MCNC: 9.5 MG/DL — SIGNIFICANT CHANGE UP (ref 8.4–10.5)
CHLORIDE SERPL-SCNC: 103 MMOL/L — SIGNIFICANT CHANGE UP (ref 98–110)
CO2 SERPL-SCNC: 24 MMOL/L — SIGNIFICANT CHANGE UP (ref 17–32)
COCAINE METAB.OTHER UR-MCNC: NEGATIVE — SIGNIFICANT CHANGE UP
CREAT SERPL-MCNC: 0.7 MG/DL — SIGNIFICANT CHANGE UP (ref 0.7–1.5)
EGFR: 122 ML/MIN/1.73M2 — SIGNIFICANT CHANGE UP
EOSINOPHIL # BLD AUTO: 0.05 K/UL — SIGNIFICANT CHANGE UP (ref 0–0.7)
EOSINOPHIL NFR BLD AUTO: 0.9 % — SIGNIFICANT CHANGE UP (ref 0–8)
ETHANOL SERPL-MCNC: <10 MG/DL — SIGNIFICANT CHANGE UP
GLUCOSE SERPL-MCNC: 124 MG/DL — HIGH (ref 70–99)
HCG SERPL QL: NEGATIVE — SIGNIFICANT CHANGE UP
HCT VFR BLD CALC: 39.5 % — SIGNIFICANT CHANGE UP (ref 37–47)
HGB BLD-MCNC: 13.7 G/DL — SIGNIFICANT CHANGE UP (ref 12–16)
IMM GRANULOCYTES NFR BLD AUTO: 0.2 % — SIGNIFICANT CHANGE UP (ref 0.1–0.3)
LYMPHOCYTES # BLD AUTO: 1.55 K/UL — SIGNIFICANT CHANGE UP (ref 1.2–3.4)
LYMPHOCYTES # BLD AUTO: 27.8 % — SIGNIFICANT CHANGE UP (ref 20.5–51.1)
MCHC RBC-ENTMCNC: 31.3 PG — HIGH (ref 27–31)
MCHC RBC-ENTMCNC: 34.7 G/DL — SIGNIFICANT CHANGE UP (ref 32–37)
MCV RBC AUTO: 90.2 FL — SIGNIFICANT CHANGE UP (ref 81–99)
METHADONE UR-MCNC: NEGATIVE — SIGNIFICANT CHANGE UP
MONOCYTES # BLD AUTO: 0.25 K/UL — SIGNIFICANT CHANGE UP (ref 0.1–0.6)
MONOCYTES NFR BLD AUTO: 4.5 % — SIGNIFICANT CHANGE UP (ref 1.7–9.3)
NEUTROPHILS # BLD AUTO: 3.66 K/UL — SIGNIFICANT CHANGE UP (ref 1.4–6.5)
NEUTROPHILS NFR BLD AUTO: 65.7 % — SIGNIFICANT CHANGE UP (ref 42.2–75.2)
NRBC # BLD: 0 /100 WBCS — SIGNIFICANT CHANGE UP (ref 0–0)
OPIATES UR-MCNC: NEGATIVE — SIGNIFICANT CHANGE UP
PCP SPEC-MCNC: SIGNIFICANT CHANGE UP
PLATELET # BLD AUTO: 334 K/UL — SIGNIFICANT CHANGE UP (ref 130–400)
PMV BLD: 9.3 FL — SIGNIFICANT CHANGE UP (ref 7.4–10.4)
POTASSIUM SERPL-MCNC: 3.7 MMOL/L — SIGNIFICANT CHANGE UP (ref 3.5–5)
POTASSIUM SERPL-SCNC: 3.7 MMOL/L — SIGNIFICANT CHANGE UP (ref 3.5–5)
PROPOXYPHENE QUALITATIVE URINE RESULT: NEGATIVE — SIGNIFICANT CHANGE UP
PROT SERPL-MCNC: 7.4 G/DL — SIGNIFICANT CHANGE UP (ref 6–8)
RBC # BLD: 4.38 M/UL — SIGNIFICANT CHANGE UP (ref 4.2–5.4)
RBC # FLD: 11.1 % — LOW (ref 11.5–14.5)
SALICYLATES SERPL-MCNC: <0.3 MG/DL — LOW (ref 4–30)
SARS-COV-2 RNA SPEC QL NAA+PROBE: SIGNIFICANT CHANGE UP
SODIUM SERPL-SCNC: 137 MMOL/L — SIGNIFICANT CHANGE UP (ref 135–146)
WBC # BLD: 5.57 K/UL — SIGNIFICANT CHANGE UP (ref 4.8–10.8)
WBC # FLD AUTO: 5.57 K/UL — SIGNIFICANT CHANGE UP (ref 4.8–10.8)

## 2023-04-28 PROCEDURE — 99285 EMERGENCY DEPT VISIT HI MDM: CPT | Mod: 25

## 2023-04-28 PROCEDURE — 87635 SARS-COV-2 COVID-19 AMP PRB: CPT

## 2023-04-28 PROCEDURE — 90792 PSYCH DIAG EVAL W/MED SRVCS: CPT | Mod: 95,GC

## 2023-04-28 PROCEDURE — 93005 ELECTROCARDIOGRAM TRACING: CPT

## 2023-04-28 PROCEDURE — 80053 COMPREHEN METABOLIC PANEL: CPT

## 2023-04-28 PROCEDURE — 85025 COMPLETE CBC W/AUTO DIFF WBC: CPT

## 2023-04-28 PROCEDURE — 93010 ELECTROCARDIOGRAM REPORT: CPT

## 2023-04-28 PROCEDURE — 99285 EMERGENCY DEPT VISIT HI MDM: CPT

## 2023-04-28 PROCEDURE — 80307 DRUG TEST PRSMV CHEM ANLYZR: CPT

## 2023-04-28 PROCEDURE — 84703 CHORIONIC GONADOTROPIN ASSAY: CPT

## 2023-04-28 SDOH — ECONOMIC STABILITY - INCOME SECURITY: UNEMPLOYMENT, UNSPECIFIED: Z56.0
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
## 2023-04-28 NOTE — ED PROVIDER NOTE - NSFOLLOWUPINSTRUCTIONS_ED_ALL_ED_FT
Safety Plan:  Step 1: Identify warning signs (thoughts, images, mood, situation, behavior) that a crisis may be developing	.  Warning Sign 1:	Isolating self or not getting out of bed  Warning Sign 2:	crying  Warning Sign 3:	thoughts of self harm or wanting to die  Step 2: Identify internal coping strategies - Things I can do to take my mind off my problems without contacting another person (relaxation technique, physical activity)	.  Internal Coping Strategy 1:	journal  Internal Coping Strategy 2:	cook  Step 3: People and social settings that provide distraction	.  Name:	Asad  Phone:	[phone]  Step 4: People whom I can ask for help	.  Name:	Asad  Phone:	[phone]  Name:	Jose  Phone:	[phone]  Step 5: Professionals or agencies I can contact during a crisis	.  Clinician Name:	Katherine Young  Phone:	[phone] ext [phone]  Suicide Prevention Lifeline Phones:	Suicide and Crisis Lifeline, call 988  .	Suicide Prevention Lifeline Phone: 9-370-259- QDSV (8362)  Environment Safety 1:	no knives  Environment Safety 2:	no large amounts of medicines  Environment Safety 3:	keep company around
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
## 2023-04-28 NOTE — ED PROVIDER NOTE - PHYSICAL EXAMINATION
CONST: Teary eyed  EYES: PERRL, EOMI, Sclera and conjunctiva clear.   ENT: No nasal discharge. Oropharynx normal appearing  NECK: Non-tender, no meningeal signs. normal ROM. supple   CARD: Normal S1 S2; Normal rate and rhythm  RESP: Equal BS B/L, No wheezes, rhonchi or rales. No distress  GI: Soft, non-tender, non-distended. no cva tenderness. normal BS  MS: Normal ROM in all extremities. No midline spinal tenderness. pulses 2 +. no calf tenderness or swelling  SKIN: Warm, dry, no acute rashes. Good turgor  NEURO: A&Ox3, No focal deficits.   PSYCH: Cooperative, teary eyed
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
## 2023-04-28 NOTE — ED BEHAVIORAL HEALTH NOTE - BEHAVIORAL HEALTH NOTE
================  PRE-HOSPITAL COURSE  =================  SOURCE:  RN and secondhand ED documentation  DETAILS:  Per chart, patient was BIB self endorsing suicidal ideations.   =========  ED COURSE  =========  SOURCE:  RN and secondhand ED documentation.  BELONGINGS:  No belongings of note. All belongings were provided to hospital security, and patient currently in a gown with a 1:1 staff member.  BEHAVIOR: RN described patient to be currently calm and cooperative, flat affect, presenting with linear thought process, states to RN there is "a lot of conflict with her dad," is fully AAOx3, remains in good behavioral and impulse control, is not currently violent/aggressive. . RN stated that the patient appears to be well-groomed.  TREATMENT:  Per chart and RN, patient did not require PRN medications.   VISITORS:  BoyfrAsad pimentel (#: [phone]) is at bedside.

## 2023-04-28 NOTE — ED PROVIDER NOTE - CLINICAL SUMMARY MEDICAL DECISION MAKING FREE TEXT BOX
patient presents for evaluation of suicidal ideation we obtained an EKG which per my independent evaluation is not consistent with STEMI in addition no QT prolongation or abnormalities noted we obtained labs which are normal based on the chief complaint of suicidal ideations consulted telepsychiatry I will continue to monitor at this time patient presents for evaluation of suicidal ideation we obtained an EKG which per my independent evaluation is not consistent with STEMI in addition no QT prolongation or abnormalities noted we obtained labs which are normal based on the chief complaint of suicidal ideations consulted telepsychiatry I will continue to monitor at this time    reji: received sign out from Dr. Zaidi. pt with SI, resolved. cleared by psych for d/c. denies SI to me.
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
## 2023-04-28 NOTE — ED PROVIDER NOTE - PATIENT PORTAL LINK FT
You can access the FollowMyHealth Patient Portal offered by [facility] by registering at the following website: [email or web address]. By joining [facility]’s FollowMyHealth portal, you will also be able to view your health information using other applications (apps) compatible with our system.

## 2023-04-28 NOTE — ED BEHAVIORAL HEALTH ASSESSMENT NOTE - DESCRIPTION
Born and raised in [place], has 2 sisters and 2 half siblings, dropped out of  in 10th grade and earned GED, lives alone and finacially supported by parents who pay bills and give her a few hundred dollars per month for food, unemployed and has difficulty keeping work due to getting too "overwhelmed" dealing with people at previous jobs, currently ale  sexual relationship with a man but on BC and no dependents Vital Signs Last 24 Hrs  T(C): 36.1 (28 Apr 2023 10:39), Max: 36.1 (28 Apr 2023 10:39)  T(F): 97 (28 Apr 2023 10:39), Max: 97 (28 Apr 2023 10:39)  HR: 113 (28 Apr 2023 10:39) (113 - 113)  BP: 122/79 (28 Apr 2023 10:39) (122/79 - 122/79)  BP(mean): --  RR: 21 (28 Apr 2023 10:39) (21 - 21)  SpO2: 98% (28 Apr 2023 10:39) (98% - 98%)  Parameters below as of 28 Apr 2023 10:39  Patient On (Oxygen Delivery Method): room air                          13.7   5.57  )-----------( 334      ( 28 Apr 2023 10:57 )             39.5   04-28    137  |  103  |  12  ----------------------------<  124<H>  3.7   |  24  |  0.7    Ca    9.5      28 Apr 2023 10:57    TPro  7.4  /  Alb  4.3  /  TBili  0.3  /  DBili  x   /  AST  23  /  ALT  23  /  AlkPhos  27<L>  04-28 childhood asthma that is now resolved

## 2023-04-28 NOTE — ED BEHAVIORAL HEALTH ASSESSMENT NOTE - REFERRAL / APPOINTMENT DETAILS
f/u w/ outpatient therapist Katherine Young at [facility]
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
## 2023-04-28 NOTE — ED BEHAVIORAL HEALTH ASSESSMENT NOTE - DIFFERENTIAL
r/o developmental disorder  r/o borderline personality disorder  r/o histrionic personality disorder  r/o narcicisstic personality disorder

## 2023-05-30 NOTE — ED BEHAVIORAL HEALTH ASSESSMENT NOTE - NS ED BHA ALCOHOL
Scott Brock (: 1959) presents today annual wellness. She recently started back on Risperdal secondary to auditory hallucinations. She is managed by Dr. Jama Gardner, psychiatrist, who is adjusting her dose. She has not had improvement in symptoms just yet. She has hx/o inpatient treatment at [facility] last fall with resolution of symptoms, which were thought to stress induced. She retired in December and weaned off Risperdal.  Her  returned to work. A few months later, her symptoms recurred: same voice, same \"bad\" voices and threats. She contacted psychiatry immediately and has been titrating up on Risperdal for a month or so. Denies SI/HI. Chief Complaint   Patient presents with    Follow-up     Patient Active Problem List   Diagnosis    Mixed conductive and sensorineural hearing loss of left ear with unrestricted hearing of right ear    Urge incontinence    Inadequate sleep hygiene    Essential hypertension    Morbid obesity with BMI of 40.0-44.9, adult (HCC)    Chronic bilateral low back pain without sciatica    Bladder pain    Severe obesity with body mass index (BMI) of 35.0 to 39.9 with serious comorbidity (HCC)    Obesity (BMI 30-39.9)    JOVANI (obstructive sleep apnea)    Gastroesophageal reflux disease without esophagitis    Sleep apnea with use of continuous positive airway pressure (CPAP)    Auditory hallucinations    Chronic sinusitis    Vitamin D deficiency    VIVIAN (stress urinary incontinence, female)    Weakness of pelvic floor    OAB (overactive bladder)        Reviewed and updated this visit by provider:  Tobacco  Allergies  Meds  Problems  Med Hx  Surg Hx  Fam Hx       Review of Systems   Constitutional:  Negative for fatigue and fever. Negative for - weight gain  Negative for - weight loss   HENT:  Negative for congestion, ear pain, hearing loss, postnasal drip, sinus pressure, sinus pain and tinnitus.     Eyes:  Negative for pain, redness and visual
Yes

## 2023-07-24 ENCOUNTER — NON-APPOINTMENT (OUTPATIENT)
Age: 28
End: 2023-07-24

## 2024-02-07 PROBLEM — T14.91XA SUICIDE ATTEMPT, INITIAL ENCOUNTER: Chronic | Status: ACTIVE | Noted: 2023-04-28
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
## 2024-03-24 ENCOUNTER — EMERGENCY (EMERGENCY)
Facility: HOSPITAL | Age: 29
LOS: 0 days | Discharge: HOME | End: 2024-03-25
Attending: EMERGENCY MEDICINE | Admitting: STUDENT IN AN ORGANIZED HEALTH CARE EDUCATION/TRAINING PROGRAM
Payer: MEDICAID

## 2024-03-24 VITALS
DIASTOLIC BLOOD PRESSURE: 95 MMHG | SYSTOLIC BLOOD PRESSURE: 135 MMHG | TEMPERATURE: 98 F | RESPIRATION RATE: 18 BRPM | HEART RATE: 105 BPM | WEIGHT: 100.09 LBS | OXYGEN SATURATION: 100 %

## 2024-03-24 DIAGNOSIS — Z20.822 CONTACT WITH AND (SUSPECTED) EXPOSURE TO COVID-19: ICD-10-CM

## 2024-03-24 DIAGNOSIS — R45.851 SUICIDAL IDEATIONS: ICD-10-CM

## 2024-03-24 DIAGNOSIS — Z86.59 PERSONAL HISTORY OF OTHER MENTAL AND BEHAVIORAL DISORDERS: ICD-10-CM

## 2024-03-24 DIAGNOSIS — R10.9 UNSPECIFIED ABDOMINAL PAIN: ICD-10-CM

## 2024-03-24 DIAGNOSIS — F60.3 BORDERLINE PERSONALITY DISORDER: ICD-10-CM

## 2024-03-24 DIAGNOSIS — Z88.0 ALLERGY STATUS TO PENICILLIN: ICD-10-CM

## 2024-03-24 DIAGNOSIS — F32.9 MAJOR DEPRESSIVE DISORDER, SINGLE EPISODE, UNSPECIFIED: ICD-10-CM

## 2024-03-24 DIAGNOSIS — F41.1 GENERALIZED ANXIETY DISORDER: ICD-10-CM

## 2024-03-24 LAB
ANION GAP SERPL CALC-SCNC: 12 MMOL/L — SIGNIFICANT CHANGE UP (ref 7–14)
APAP SERPL-MCNC: <5 UG/ML — LOW (ref 10–30)
APPEARANCE UR: ABNORMAL
BASOPHILS # BLD AUTO: 0.05 K/UL — SIGNIFICANT CHANGE UP (ref 0–0.2)
BASOPHILS NFR BLD AUTO: 1 % — SIGNIFICANT CHANGE UP (ref 0–1)
BILIRUB UR-MCNC: NEGATIVE — SIGNIFICANT CHANGE UP
BUN SERPL-MCNC: 10 MG/DL — SIGNIFICANT CHANGE UP (ref 10–20)
CALCIUM SERPL-MCNC: 9.5 MG/DL — SIGNIFICANT CHANGE UP (ref 8.4–10.5)
CHLORIDE SERPL-SCNC: 103 MMOL/L — SIGNIFICANT CHANGE UP (ref 98–110)
CO2 SERPL-SCNC: 21 MMOL/L — SIGNIFICANT CHANGE UP (ref 17–32)
COLOR SPEC: YELLOW — SIGNIFICANT CHANGE UP
CREAT SERPL-MCNC: 0.7 MG/DL — SIGNIFICANT CHANGE UP (ref 0.7–1.5)
DIFF PNL FLD: NEGATIVE — SIGNIFICANT CHANGE UP
EGFR: 121 ML/MIN/1.73M2 — SIGNIFICANT CHANGE UP
EOSINOPHIL # BLD AUTO: 0.21 K/UL — SIGNIFICANT CHANGE UP (ref 0–0.7)
EOSINOPHIL NFR BLD AUTO: 4 % — SIGNIFICANT CHANGE UP (ref 0–8)
ETHANOL SERPL-MCNC: <10 MG/DL — SIGNIFICANT CHANGE UP
FLUAV AG NPH QL: SIGNIFICANT CHANGE UP
FLUBV AG NPH QL: SIGNIFICANT CHANGE UP
GLUCOSE SERPL-MCNC: 102 MG/DL — HIGH (ref 70–99)
GLUCOSE UR QL: NEGATIVE MG/DL — SIGNIFICANT CHANGE UP
HCT VFR BLD CALC: 43.1 % — SIGNIFICANT CHANGE UP (ref 37–47)
HGB BLD-MCNC: 14.8 G/DL — SIGNIFICANT CHANGE UP (ref 12–16)
IMM GRANULOCYTES NFR BLD AUTO: 0 % — LOW (ref 0.1–0.3)
KETONES UR-MCNC: ABNORMAL MG/DL
LEUKOCYTE ESTERASE UR-ACNC: ABNORMAL
LYMPHOCYTES # BLD AUTO: 2.14 K/UL — SIGNIFICANT CHANGE UP (ref 1.2–3.4)
LYMPHOCYTES # BLD AUTO: 41.1 % — SIGNIFICANT CHANGE UP (ref 20.5–51.1)
MCHC RBC-ENTMCNC: 30.8 PG — SIGNIFICANT CHANGE UP (ref 27–31)
MCHC RBC-ENTMCNC: 34.3 G/DL — SIGNIFICANT CHANGE UP (ref 32–37)
MCV RBC AUTO: 89.8 FL — SIGNIFICANT CHANGE UP (ref 81–99)
MONOCYTES # BLD AUTO: 0.36 K/UL — SIGNIFICANT CHANGE UP (ref 0.1–0.6)
MONOCYTES NFR BLD AUTO: 6.9 % — SIGNIFICANT CHANGE UP (ref 1.7–9.3)
NEUTROPHILS # BLD AUTO: 2.45 K/UL — SIGNIFICANT CHANGE UP (ref 1.4–6.5)
NEUTROPHILS NFR BLD AUTO: 47 % — SIGNIFICANT CHANGE UP (ref 42.2–75.2)
NITRITE UR-MCNC: NEGATIVE — SIGNIFICANT CHANGE UP
NRBC # BLD: 0 /100 WBCS — SIGNIFICANT CHANGE UP (ref 0–0)
PH UR: 6 — SIGNIFICANT CHANGE UP (ref 5–8)
PLATELET # BLD AUTO: 296 K/UL — SIGNIFICANT CHANGE UP (ref 130–400)
PMV BLD: 9.8 FL — SIGNIFICANT CHANGE UP (ref 7.4–10.4)
POTASSIUM SERPL-MCNC: 4 MMOL/L — SIGNIFICANT CHANGE UP (ref 3.5–5)
POTASSIUM SERPL-SCNC: 4 MMOL/L — SIGNIFICANT CHANGE UP (ref 3.5–5)
PROT UR-MCNC: SIGNIFICANT CHANGE UP MG/DL
RBC # BLD: 4.8 M/UL — SIGNIFICANT CHANGE UP (ref 4.2–5.4)
RBC # FLD: 11.8 % — SIGNIFICANT CHANGE UP (ref 11.5–14.5)
RSV RNA NPH QL NAA+NON-PROBE: SIGNIFICANT CHANGE UP
SALICYLATES SERPL-MCNC: <0.3 MG/DL — LOW (ref 4–30)
SARS-COV-2 RNA SPEC QL NAA+PROBE: SIGNIFICANT CHANGE UP
SODIUM SERPL-SCNC: 136 MMOL/L — SIGNIFICANT CHANGE UP (ref 135–146)
SP GR SPEC: 1.02 — SIGNIFICANT CHANGE UP (ref 1–1.03)
UROBILINOGEN FLD QL: 0.2 MG/DL — SIGNIFICANT CHANGE UP (ref 0.2–1)
WBC # BLD: 5.21 K/UL — SIGNIFICANT CHANGE UP (ref 4.8–10.8)
WBC # FLD AUTO: 5.21 K/UL — SIGNIFICANT CHANGE UP (ref 4.8–10.8)

## 2024-03-24 PROCEDURE — 93005 ELECTROCARDIOGRAM TRACING: CPT

## 2024-03-24 PROCEDURE — 81001 URINALYSIS AUTO W/SCOPE: CPT

## 2024-03-24 PROCEDURE — 36415 COLL VENOUS BLD VENIPUNCTURE: CPT

## 2024-03-24 PROCEDURE — 93010 ELECTROCARDIOGRAM REPORT: CPT

## 2024-03-24 PROCEDURE — 99285 EMERGENCY DEPT VISIT HI MDM: CPT | Mod: 25

## 2024-03-24 PROCEDURE — 85025 COMPLETE CBC W/AUTO DIFF WBC: CPT

## 2024-03-24 PROCEDURE — 90792 PSYCH DIAG EVAL W/MED SRVCS: CPT | Mod: 95,GC

## 2024-03-24 PROCEDURE — 99285 EMERGENCY DEPT VISIT HI MDM: CPT

## 2024-03-24 PROCEDURE — 0241U: CPT

## 2024-03-24 PROCEDURE — 80048 BASIC METABOLIC PNL TOTAL CA: CPT

## 2024-03-24 PROCEDURE — 80307 DRUG TEST PRSMV CHEM ANLYZR: CPT
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
## 2024-03-24 NOTE — ED BEHAVIORAL HEALTH ASSESSMENT NOTE - OTHER
reports 45 min prior to interview that she had auditory, visual and tactile hallucinations of a ghost hugging here and saying he loved her

## 2024-03-24 NOTE — ED PROVIDER NOTE - NSFOLLOWUPINSTRUCTIONS_ED_ALL_ED_FT
Suicidal Feelings: How to Help Yourself  Suicide is when you end your own life. Suicidal ideation includes expressing thoughts about, or a preoccupation with, ending your own life. There are many things you can do to help yourself feel better when struggling with these feelings. Many services and people are available to support you and others who struggle with similar feelings.    If you ever feel like you may hurt yourself or others, or have thoughts about taking your own life, get help right away. To get help:  Go to your nearest emergency department.  Call your local emergency services (971 in the [place]  Call the [facility] and [facility] services helpline (280 in the [place]  Call or text a suicide hotline to speak with a trained counselor. The following suicide hotlines are available in the [place]:  4-745-901-TALK (1-796.437.2997 or 202 in the [place]  3-388-ZVXPASU (1-543.451.3082).  Text 846518. This is the Crisis Text Line in the [place]  1-690.570.6343. This is a hotline for Khmer speakers.  1-318.638.2889. This is a hotline for TTY users.  0-118-1-U-RUI (1-656.628.4357). This is a hotline for lesbian, cook, bisexual, transgender, or questioning youth.  For a list of hotlines in [place], visit suicide.org/hotlines/international/ftiztr-fntzzoh-upkltbar.html  Contact a crisis center or a local suicide prevention center. To find a crisis center or suicide prevention center:  Call your local hospital, clinic, community service organization, mental health center, social service provider, or health department. Ask for help with connecting to a crisis center.  For a list of crisis centers in the [place], visit: suicidepreventionlifeline.org  For a list of crisis centers in [place], visit: suicideprevention.ca  How to help yourself feel better  A teen talking with an adult.  Promise yourself that you will not do anything bad or extreme when you have suicidal feelings. Remember the times you have felt hopeful.  Many people have gotten through suicidal thoughts and feelings, and you can too.  If you have had these feelings before, remind yourself that you can get through them again.  Let family, friends, teachers, or counselors know how you are feeling. Do not separate yourself from those who care about you and want to help you.  Talk with someone every day, even if you do not feel like talking to anyone or being with other people.  Face-to-face conversation is best to help them understand your feelings.  Contact a mental health care provider and work with this person regularly.  Make a safety plan that you can follow during a crisis.  Include phone numbers of suicide prevention hotlines, mental health professionals, and trusted friends and family members you can call during an emergency.  Save these numbers on your phone.  If you are thinking of taking a lot of medicine, give your medicine to someone who can give it to you as prescribed.  If you are on antidepressants and are concerned you will overdose, tell your health care provider so that he or she can give you safer medicines.  Try to stick to your routines and follow a schedule every day. Make self-care a priority.  Make a list of realistic goals, and cross them off when you achieve them. Accomplishments can give you a sense of worth.  Wait until you are feeling better before doing things that you find difficult or unpleasant.  Do things that you have always enjoyed to take your mind off your feelings.  Try reading a book, or listening to or playing music.  Spending time outside, in nature, may help you feel better.  Follow these instructions at home:  A sign asking the reader not to drink beer, wine, or hard liquor.   Visit your primary health care provider every year for a physical and a mental health checkup.  Take over-the-counter and prescription medicines only as told by your health care provider.  Ask your health care provider about the possible side effects of any medicines you are taking.  Ask your health care provider about whether suicidal ideation is a possible side effect of any of your medicines.  Learn about suicidal ideation and what increases the risk for the development of suicidal thoughts.  Eat a well-balanced diet, and eat regular meals.  Get plenty of rest.  Exercise if you are able. Just 30 minutes of exercise each day can help you feel better.  Keep your living space well lit.  Do not use alcohol or drugs. Remove these substances from your home.  General recommendations  Remove weapons, poisons, knives, and other deadly items from your home.  Work with a mental health care provider as needed.  When you are feeling well, write yourself a letter with tips and support that you can read when you are not feeling well.  Remember that life's difficulties can be sorted out with help. Conditions can be treated, and you can learn behaviors and ways of thinking that will help you.  Work with your health care provider or counselor to learn ways of coping with your thoughts and feelings.  Where to find more information  National Suicide Prevention Lifeline: www.suicidepreventionlifeline.org  Hopeline: www.hopeline.TheCrowd  American Foundation for Suicide Prevention: www.afsp.org  The Rui Project (for lesbian, cook, bisexual, transgender, or questioning youth): www.thetrevorproject.org  National [place] of Mental Health: www.nimh.nih.gov/health/topics/suicide-prevention  Suicide Prevention Resources: afsp.org/suicide-prevention-resources  Contact a health care provider if:  You feel as though you are a burden to others.  You feel agitated, angry, vengeful, or have extreme mood swings.  You have withdrawn from family and friends.  You are frequently using drugs or alcohol.  Get help right away if:  You are talking about suicide or wishing to die.  You start making plans for how to commit suicide.  You feel that you have no reason to live.  You start making plans for putting your affairs in order, saying goodbye, or giving your possessions away.  You feel guilt, shame, or unbearable pain, and it seems like there is no way out.  You are engaging in risky behaviors that could lead to death.  If you have any of these thoughts or symptoms, get help right away:  Go to your nearest emergency department or crisis center.  Call emergency services (911 in the [place]  Call or text a suicide crisis helpline.  Summary  Suicide is when you take your own life. Suicidal feelings are thoughts about ending your own life.  Promise yourself that you will not do anything bad or extreme when you have suicidal feelings.  Let family, friends, teachers, or counselors know how you are feeling.  Get help right away if you start making plans for how to commit suicide.  This information is not intended to replace advice given to you by your health care provider. Make sure you discuss any questions you have with your health care provider.
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
## 2024-03-24 NOTE — ED BEHAVIORAL HEALTH ASSESSMENT NOTE - ADDITIONAL DETAILS ALL
reports NSSIB by cutting - last time in 2019.  reports multiple past aborted suicidal gestures as described in HPI, but none are witness or confirmed and her details are sparse.

## 2024-03-24 NOTE — BH SAFETY PLAN - ENVIRONMENT SAFETY 2:
all medications should be locked up and out of reach.  A trusted adult should distribute my medications to me daily

## 2024-03-24 NOTE — ED ADULT TRIAGE NOTE - CHIEF COMPLAINT QUOTE
endorses feelings of SI - initially called the suicide hotline and was referred to ED. Denies active plan   Hx of PTSD, Generalized Anxiety disorder, Major Depression  Immediately placed on 1:1

## 2024-03-24 NOTE — ED BEHAVIORAL HEALTH ASSESSMENT NOTE - NSBHATTESTCOMMENTATTENDFT_PSY_A_CORE
Patient is a 27yo  F, single w/ no children, domiciled alone in a house on [address], currently unemployed and supported financially by parents and ex bf, w/ no significant PMHx, and PPHx per chart of borderline personality disorder, MDD, DIAZ, and PTSD, h[facility] of [facility] ([facility]), hx of NSSIB via cutting and head banging (last in 2019), reported hx of multiple aborted suicidal gestures (last in 2014 states that she walked to the [place] [employer] and was stopped by [facility] before she started walking on the bridge) however has no witnessed or confirmed suicide attempts and is unable to give details about any of them, no substance use hx, followed psychiatric NP Miranda Kenyon, not currently on Rx.  Patient was brought to the ED by her ex-boyfriend for SI after she called [phone] and they advised her to come to the ED.      Writer agrees with Dr. Christopher's assessment.    Aishwarya on evaluation was able to safety plan with writer and we were able to discuss various coping mechanisms she can use when stressed, including meditation. Patient denied any symptoms associated with dangerousness and described the SI she had prior to coming to the ER as "fleeting". Patient was offered voluntary psychiatric admission but stated she came to the hospital primarily because she wanted someone to speak to. We discussed community supports and psychotherapy referral was offered and patient refused. Patient does not meet criteria for involuntary psychiatric admission and is appropriate for outpatient level care. She is aware she can return to the ER if any safety concerns arise, and patient is in general help-seeking.
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
## 2024-03-24 NOTE — ED ADULT TRIAGE NOTE - NS_BH TRG QUESTION3_ED_ALL_ED
7/28/20, 11:41 AM EDT    DISCHARGE ONGOING EVALUATION:     Norman Issa day: 2  Location: [facility]17/017- Ruiz Ba for wound care. No

## 2024-03-24 NOTE — ED PROVIDER NOTE - PHYSICAL EXAMINATION
CONST: Well appearing in NAD  EYES: PERRL, EOMI, Sclera and conjunctiva clear.   ENT: No nasal discharge. Oropharynx normal appearing.   NECK: Non-tender, no meningeal signs. normal ROM. supple   CARD: S1 S2; No jvd  RESP: Equal BS B/L, No wheezes, rhonchi or rales. No distress  GI: Soft, non-tender, non-distended. normal BS  MS: Normal ROM in all extremities. pulses 2 +. no calf tenderness or swelling  SKIN: Warm, dry, no acute rashes. Good turgor  NEURO: A&Ox3, No focal deficits. Strength 5/5 with no sensory deficits. Steady gait.

## 2024-03-24 NOTE — ED BEHAVIORAL HEALTH ASSESSMENT NOTE - SAFETY PLAN ADDT'L DETAILS
Safety plan discussed with.../Education provided regarding environmental safety / lethal means restriction/Provision of National Suicide Prevention Lifeline 7-775-374-VHIO (0251)

## 2024-03-24 NOTE — ED BEHAVIORAL HEALTH ASSESSMENT NOTE - DESCRIPTION
calm and cooperative    Vital Signs Last 24 Hrs  T(C): 36.7 (24 Mar 2024 16:16), Max: 36.7 (24 Mar 2024 16:16)  T(F): 98 (24 Mar 2024 16:16), Max: 98 (24 Mar 2024 16:16)  HR: 105 (24 Mar 2024 16:16) (105 - 105)  BP: 135/95 (24 Mar 2024 16:16) (135/95 - 135/95)  BP(mean): --  RR: 18 (24 Mar 2024 16:16) (18 - 18)  SpO2: 100% (24 Mar 2024 16:16) (100% - 100%) see hpi denies
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
## 2024-03-24 NOTE — ED PROVIDER NOTE - PROGRESS NOTE DETAILS
DC: endorsed to Dr. Gifford pending telepsych eval, reassessment, disposition. JS: Appreciate recs from telepsych.  Patient will be a treat and release will provide information for outpatient psych follow-up.  Return precautions discussed.
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
## 2024-03-24 NOTE — ED PROVIDER NOTE - PATIENT PORTAL LINK FT
You can access the FollowMyHealth Patient Portal offered by [facility] by registering at the following website: [email or web address]. By joining [employer]’s FollowMyHealth portal, you will also be able to view your health information using other applications (apps) compatible with our system.

## 2024-03-25 DIAGNOSIS — F60.3 BORDERLINE PERSONALITY DISORDER: ICD-10-CM

## 2024-03-25 DIAGNOSIS — Z86.59 PERSONAL HISTORY OF OTHER MENTAL AND BEHAVIORAL DISORDERS: ICD-10-CM

## 2024-03-25 NOTE — ED ADULT NURSE NOTE - OBJECTIVE STATEMENT
C/O have suicidal ideation. Called [phone] and she was referred to the ED.   patient stated that she does not have a plan of SI.

## 2024-03-25 NOTE — ED ADULT NURSE NOTE - NSFALLUNIVINTERV_ED_ALL_ED
Bed/Stretcher in lowest position, wheels locked, appropriate side rails in place/Call bell, personal items and telephone in reach/Instruct patient to call for assistance before getting out of bed/chair/stretcher/Non-slip footwear applied when patient is off stretcher/[place] to call system/Physically safe environment - no spills, clutter or unnecessary equipment/Purposeful proactive rounding/Room/bathroom lighting operational, light cord in reach

## 2024-03-29 NOTE — ED BEHAVIORAL HEALTH ASSESSMENT NOTE - NICOTINE
Patient requests all Lab, Cardiology, and Radiology Results on their Discharge Instructions None known

## 2024-07-18 ENCOUNTER — APPOINTMENT (OUTPATIENT)
Dept: NEUROLOGY | Facility: CLINIC | Age: 29
End: 2024-07-18
Payer: MEDICAID

## 2024-07-18 VITALS
RESPIRATION RATE: 17 BRPM | WEIGHT: 103 LBS | HEART RATE: 78 BPM | DIASTOLIC BLOOD PRESSURE: 74 MMHG | SYSTOLIC BLOOD PRESSURE: 111 MMHG | BODY MASS INDEX: 20.76 KG/M2 | HEIGHT: 59 IN | OXYGEN SATURATION: 97 %

## 2024-07-18 DIAGNOSIS — Z87.898 PERSONAL HISTORY OF OTHER SPECIFIED CONDITIONS: ICD-10-CM

## 2024-07-18 DIAGNOSIS — Z83.3 FAMILY HISTORY OF DIABETES MELLITUS: ICD-10-CM

## 2024-07-18 DIAGNOSIS — Z82.49 FAMILY HISTORY OF ISCHEMIC HEART DISEASE AND OTHER DISEASES OF THE CIRCULATORY SYSTEM: ICD-10-CM

## 2024-07-18 DIAGNOSIS — R29.2 ABNORMAL REFLEX: ICD-10-CM

## 2024-07-18 DIAGNOSIS — R55 SYNCOPE AND COLLAPSE: ICD-10-CM

## 2024-07-18 DIAGNOSIS — R20.2 ANESTHESIA OF SKIN: ICD-10-CM

## 2024-07-18 DIAGNOSIS — R41.89 OTHER SYMPTOMS AND SIGNS INVOLVING COGNITIVE FUNCTIONS AND AWARENESS: ICD-10-CM

## 2024-07-18 DIAGNOSIS — R20.0 ANESTHESIA OF SKIN: ICD-10-CM

## 2024-07-18 DIAGNOSIS — F43.10 POST-TRAUMATIC STRESS DISORDER, UNSPECIFIED: ICD-10-CM

## 2024-07-18 DIAGNOSIS — Z81.8 FAMILY HISTORY OF OTHER MENTAL AND BEHAVIORAL DISORDERS: ICD-10-CM

## 2024-07-18 DIAGNOSIS — Z83.438 FAMILY HISTORY OF OTHER DISORDER OF LIPOPROTEIN METABOLISM AND OTHER LIPIDEMIA: ICD-10-CM

## 2024-07-18 DIAGNOSIS — Z82.62 FAMILY HISTORY OF OSTEOPOROSIS: ICD-10-CM

## 2024-07-18 DIAGNOSIS — R44.1 VISUAL HALLUCINATIONS: ICD-10-CM

## 2024-07-18 DIAGNOSIS — F32.A ANXIETY DISORDER, UNSPECIFIED: ICD-10-CM

## 2024-07-18 DIAGNOSIS — F41.9 ANXIETY DISORDER, UNSPECIFIED: ICD-10-CM

## 2024-07-18 DIAGNOSIS — L30.9 DERMATITIS, UNSPECIFIED: ICD-10-CM

## 2024-07-18 DIAGNOSIS — Z87.09 PERSONAL HISTORY OF OTHER DISEASES OF THE RESPIRATORY SYSTEM: ICD-10-CM

## 2024-07-18 DIAGNOSIS — M62.838 OTHER MUSCLE SPASM: ICD-10-CM

## 2024-07-18 PROCEDURE — 99204 OFFICE O/P NEW MOD 45 MIN: CPT

## 2024-07-18 RX ORDER — LEVONORGESTREL AND ETHINYL ESTRADIOL 0.1-0.02MG
0.1-2 KIT ORAL DAILY
Refills: 0 | Status: ACTIVE | COMMUNITY

## 2024-08-13 ENCOUNTER — APPOINTMENT (OUTPATIENT)
Dept: NEUROLOGY | Facility: CLINIC | Age: 29
End: 2024-08-13
Payer: MEDICAID

## 2024-08-13 PROCEDURE — 95816 EEG AWAKE AND DROWSY: CPT

## 2024-09-04 ENCOUNTER — APPOINTMENT (OUTPATIENT)
Dept: NEUROLOGY | Facility: CLINIC | Age: 29
End: 2024-09-04
Payer: MEDICAID

## 2024-09-04 VITALS
HEART RATE: 96 BPM | BODY MASS INDEX: 19.15 KG/M2 | HEIGHT: 59 IN | OXYGEN SATURATION: 97 % | WEIGHT: 95 LBS | SYSTOLIC BLOOD PRESSURE: 107 MMHG | DIASTOLIC BLOOD PRESSURE: 74 MMHG | RESPIRATION RATE: 16 BRPM

## 2024-09-04 DIAGNOSIS — F41.9 ANXIETY DISORDER, UNSPECIFIED: ICD-10-CM

## 2024-09-04 DIAGNOSIS — R20.0 ANESTHESIA OF SKIN: ICD-10-CM

## 2024-09-04 DIAGNOSIS — R41.89 OTHER SYMPTOMS AND SIGNS INVOLVING COGNITIVE FUNCTIONS AND AWARENESS: ICD-10-CM

## 2024-09-04 DIAGNOSIS — R55 SYNCOPE AND COLLAPSE: ICD-10-CM

## 2024-09-04 DIAGNOSIS — M54.14 RADICULOPATHY, THORACIC REGION: ICD-10-CM

## 2024-09-04 DIAGNOSIS — F32.A ANXIETY DISORDER, UNSPECIFIED: ICD-10-CM

## 2024-09-04 DIAGNOSIS — R44.1 VISUAL HALLUCINATIONS: ICD-10-CM

## 2024-09-04 DIAGNOSIS — M62.838 OTHER MUSCLE SPASM: ICD-10-CM

## 2024-09-04 DIAGNOSIS — R20.2 ANESTHESIA OF SKIN: ICD-10-CM

## 2024-09-04 PROCEDURE — 95912 NRV CNDJ TEST 11-12 STUDIES: CPT

## 2024-09-04 PROCEDURE — 99213 OFFICE O/P EST LOW 20 MIN: CPT | Mod: 25

## 2024-09-04 PROCEDURE — 95886 MUSC TEST DONE W/N TEST COMP: CPT

## 2024-09-04 RX ORDER — LUMATEPERONE 10.5 MG/1
10.5 CAPSULE ORAL
Refills: 0 | Status: ACTIVE | COMMUNITY

## 2024-09-04 NOTE — PROCEDURE
[FreeTextEntry1] : 3/30/24: brain MRI: reported unremarkable. Images reviewed. The right hippocampus is smaller to my review. 7/18/24 cervical spine MRI: reported mild left paracentral C5-6 disc. Images reviewed with patient. The T2 area appears to have some spinal canal narrowing, though not well visualized. 9/4/24 NCS/EMG: normal.

## 2024-09-04 NOTE — PHYSICAL EXAM
[FreeTextEntry1] : Constitutional: alert and in no acute distress. Psychiatric: oriented to person, place, and time, insight and judgment were intact and the affect was normal. Neurologic:   Mental Status:. Knew year, month and day of week. Immediate repeat 3/3. In 5 minutes, 2/3. Not better with cueing.   Orientation: oriented to person, oriented to place and oriented to time.   Attention: normal concentrating ability and visual attention was not decreased.   Language: no difficulty naming common objects, no difficulty repeating a phrase, no difficulty writing a sentence, fluency intact, comprehension intact and reading intact.   Fund of knowledge: displays adequate knowledge of personal past history.   Cranial Nerves: visual acuity intact bilaterally, visual fields full to confrontation, pupils equal round and reactive to light, extraocular motion intact, facial sensation intact symmetrically, face symmetrical, hearing was intact bilaterally, tongue and palate midline, head turning and shoulder shrug symmetric and there was no tongue deviation with protrusion.   Motor: muscle tone was normal in all four extremities, muscle strength was normal in all four extremities and normal bulk in all four extremities.   Motor Strength:. the patient is right hand dominant.   Sensory exam: pain and temperature was intact, vibration was intact, proprioception was intact and no allodynia present . Romberg's sign was negative.   Light Touch: (only volar aspect of left 4th digit with increased sensation to light touch).   Coordination:. normal gait. balance was intact. there was no past-pointing. no tremor present. Dysdiadochokinesia was not present. Finger to nose dysmetria was not present. Heel-shin dysmetria was not present.   Deep tendon reflexes: Biceps right 3+. Biceps left 3+.  Triceps right 3+. Triceps left 3+.  Brachioradialis right 3+. Brachioradialis left 3+.  Patella right 3+. Patella left 3+.  Ankle jerk right 2+. Ankle jerk left 3+.   Plantar responses normal on the right, normal on the left.   Ankle Clonus Non sustained beats present on the left.    Superficial/Primitive Reflexes: the glabellar reflex was absent . Jaw Jerk. She startled on noxious stimulus. Neck:. rigidity of left more than right scapular muscles, normal ROM. Vascular:. there was no peripheral edema. Back: no spinal tenderness.

## 2024-09-04 NOTE — DISCUSSION/SUMMARY
[FreeTextEntry1] : Due to persistence of multiple symptoms suggestive of episodic cerebral dysfunction and the right hippocampus from brain MRI is smaller than the left side, Two REEG studies this year were normal. Proceed with epilepsy consult. The hyperreflexia and left Non sustained ankle clonus has no anatomic pathology from current study. Her upper thoracic spinal muscles are rigid and painful. The cervical spine MRI shows suspicious pathology at the T2 level. Will proceed to have thoracic spine MRI. Advised her importance of close monitor by psychiatrist for her depression.
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
## 2024-10-07 ENCOUNTER — APPOINTMENT (OUTPATIENT)
Dept: CARDIOLOGY | Facility: CLINIC | Age: 29
End: 2024-10-07
Payer: MEDICAID

## 2024-10-07 VITALS
RESPIRATION RATE: 16 BRPM | DIASTOLIC BLOOD PRESSURE: 70 MMHG | HEIGHT: 59 IN | HEART RATE: 85 BPM | SYSTOLIC BLOOD PRESSURE: 100 MMHG | WEIGHT: 98 LBS | BODY MASS INDEX: 19.76 KG/M2 | OXYGEN SATURATION: 98 %

## 2024-10-07 DIAGNOSIS — R00.2 PALPITATIONS: ICD-10-CM

## 2024-10-07 PROCEDURE — 99204 OFFICE O/P NEW MOD 45 MIN: CPT

## 2024-10-07 PROCEDURE — ZZZZZ: CPT

## 2024-10-10 ENCOUNTER — APPOINTMENT (OUTPATIENT)
Dept: NEUROLOGY | Facility: CLINIC | Age: 29
End: 2024-10-10
Payer: MEDICAID

## 2024-10-10 VITALS
HEIGHT: 59 IN | SYSTOLIC BLOOD PRESSURE: 96 MMHG | WEIGHT: 97 LBS | HEART RATE: 83 BPM | DIASTOLIC BLOOD PRESSURE: 64 MMHG | BODY MASS INDEX: 19.56 KG/M2 | OXYGEN SATURATION: 94 % | RESPIRATION RATE: 16 BRPM

## 2024-10-10 DIAGNOSIS — R20.0 ANESTHESIA OF SKIN: ICD-10-CM

## 2024-10-10 DIAGNOSIS — M62.838 OTHER MUSCLE SPASM: ICD-10-CM

## 2024-10-10 DIAGNOSIS — F32.A ANXIETY DISORDER, UNSPECIFIED: ICD-10-CM

## 2024-10-10 DIAGNOSIS — R44.1 VISUAL HALLUCINATIONS: ICD-10-CM

## 2024-10-10 DIAGNOSIS — F41.9 ANXIETY DISORDER, UNSPECIFIED: ICD-10-CM

## 2024-10-10 DIAGNOSIS — R41.89 OTHER SYMPTOMS AND SIGNS INVOLVING COGNITIVE FUNCTIONS AND AWARENESS: ICD-10-CM

## 2024-10-10 DIAGNOSIS — R55 SYNCOPE AND COLLAPSE: ICD-10-CM

## 2024-10-10 DIAGNOSIS — R20.2 ANESTHESIA OF SKIN: ICD-10-CM

## 2024-10-10 DIAGNOSIS — R29.2 ABNORMAL REFLEX: ICD-10-CM

## 2024-10-10 PROCEDURE — 99214 OFFICE O/P EST MOD 30 MIN: CPT

## 2024-10-10 RX ORDER — MULTIVITAMIN WITH FOLIC ACID 400 MCG
TABLET ORAL
Qty: 30 | Refills: 0 | Status: ACTIVE | COMMUNITY
Start: 2024-09-12

## 2024-10-10 RX ORDER — BUSPIRONE HYDROCHLORIDE 10 MG/1
10 TABLET ORAL
Qty: 60 | Refills: 0 | Status: ACTIVE | COMMUNITY
Start: 2024-09-25

## 2024-10-10 RX ORDER — CLONAZEPAM 0.5 MG/1
0.5 TABLET ORAL
Qty: 15 | Refills: 0 | Status: ACTIVE | COMMUNITY
Start: 2024-09-25

## 2024-10-10 RX ORDER — ERGOCALCIFEROL 1.25 MG/1
1.25 MG CAPSULE, LIQUID FILLED ORAL
Qty: 4 | Refills: 0 | Status: ACTIVE | COMMUNITY
Start: 2024-09-12

## 2024-10-21 ENCOUNTER — APPOINTMENT (OUTPATIENT)
Dept: CARDIOLOGY | Facility: CLINIC | Age: 29
End: 2024-10-21
Payer: MEDICAID

## 2024-10-21 PROCEDURE — 93306 TTE W/DOPPLER COMPLETE: CPT

## 2024-11-08 ENCOUNTER — NON-APPOINTMENT (OUTPATIENT)
Age: 29
End: 2024-11-08

## 2024-11-08 NOTE — CONSULT NOTE ADULT - PROVIDER SPECIALTY LIST ADULT
Infectious Disease 11.0   15.82 )-----------( 278      ( 2024 10:50 )             32.2   Urinalysis Basic - ( 2024 10:50 )    Color: Yellow / Appearance: Turbid / S.020 / pH: x  Gluc: x / Ketone: 15 mg/dL  / Bili: Negative / Urobili: 1.0 mg/dL   Blood: x / Protein: 30 mg/dL / Nitrite: Negative   Leuk Esterase: Small / RBC: 0 /HPF / WBC 2 /HPF   Sq Epi: x / Non Sq Epi: x / Bacteria: Negative /HPF

## 2024-11-11 ENCOUNTER — APPOINTMENT (OUTPATIENT)
Dept: NEUROLOGY | Facility: CLINIC | Age: 29
End: 2024-11-11
Payer: MEDICAID

## 2024-11-11 VITALS
WEIGHT: 100 LBS | OXYGEN SATURATION: 99 % | SYSTOLIC BLOOD PRESSURE: 121 MMHG | DIASTOLIC BLOOD PRESSURE: 70 MMHG | HEIGHT: 59 IN | HEART RATE: 92 BPM | RESPIRATION RATE: 18 BRPM | BODY MASS INDEX: 20.16 KG/M2

## 2024-11-11 DIAGNOSIS — R55 SYNCOPE AND COLLAPSE: ICD-10-CM

## 2024-11-11 PROCEDURE — 99204 OFFICE O/P NEW MOD 45 MIN: CPT

## 2024-11-19 NOTE — DISCHARGE NOTE BEHAVIORAL HEALTH - NSBHDCSIGEVENTSFT_PSY_A_CORE
Detail Level: Detailed Render Post-Care Instructions In Note?: yes Consent: The patient's consent was obtained including but not limited to risks of crusting, scabbing, blistering, scarring, darker or lighter pigmentary change, recurrence, incomplete removal and infection. Number Of Freeze-Thaw Cycles: 1 freeze-thaw cycle Render Note In Bullet Format When Appropriate: No Duration Of Freeze Thaw-Cycle (Seconds): 3 Post-Care Instructions: I reviewed with the patient in detail post-care instructions. Patient is to wear sunprotection, and avoid picking at any of the treated lesions. Pt may apply Vaseline to crusted or scabbing areas. -

## 2024-12-02 ENCOUNTER — APPOINTMENT (OUTPATIENT)
Dept: CARDIOLOGY | Facility: CLINIC | Age: 29
End: 2024-12-02

## 2024-12-02 ENCOUNTER — APPOINTMENT (OUTPATIENT)
Dept: NEUROLOGY | Facility: CLINIC | Age: 29
End: 2024-12-02

## 2024-12-02 ENCOUNTER — NON-APPOINTMENT (OUTPATIENT)
Age: 29
End: 2024-12-02

## 2024-12-04 ENCOUNTER — APPOINTMENT (OUTPATIENT)
Dept: NEUROLOGY | Facility: CLINIC | Age: 29
End: 2024-12-04

## 2024-12-06 NOTE — ED BEHAVIORAL HEALTH ASSESSMENT NOTE - CURRENTLY ENROLLED STUDENT
[facility] [address]  [place] [address]  Dept: [phone]  Dept Fax: [phone]      Chika Cook :1990 MRN:[id number]      2024 Time Session Began: 7:00am  Time Session Ended: 8:01am      Adult Virtual: This visit was performed via live interactive two-way Video visit with patient's verbal consent. Clinician Location:Home Patient Location: Home.. Patient's identity was verified. The Consent for Treatment, which includes patient rights and the grievance procedure, was reviewed and signed by patient or legal representative as part of the pre check in process for their virtual appointment today. The Consent was reviewed verbally with the patient and/or legal representative by this provider and any concerns or questions were addressed. Information including the Missed Appointment Agreement, After Hours contact information, and Fee Schedule was sent to the patient via their secure patient portal for reference after the appointment.      Session Type: Therapy 53+ minutes (08191)    Others Present: N/A    Intervention: Cognitive Behavioral, Supportive    Suicide/Homicide/Violence Ideation: No    If Yes, explain: N/A    Current Outpatient Medications   Medication Sig    HYDROcodone-acetaminophen (NORCO) 5-325 MG per tablet Take 2 tablets by mouth 1 hour prior to procedure. Then 1 to 2 tablets every 4 to 6 hours as needed for pain following procedure    ALPRAZolam (Xanax) 1 MG tablet Take 1 tablet by mouth 1 hour prior to procedure    escitalopram (LEXAPRO) 5 MG tablet Take 1 tablet by mouth daily.    levothyroxine 50 MCG tablet Take 1 tablet by mouth daily.    clonazePAM (KlonoPIN) 0.5 MG tablet Take 1 tablet by mouth 2 times daily as needed for Anxiety.    naratriptan (AMERGE) 2.5 MG tablet Take 1 tablet by mouth at onset of migraine. May repeat after 4 hours if needed.  Not more than 8 days per month    MELATONIN PO Take 10 mg by mouth nightly as needed.     cholecalciferol (VITAMIN D) 25 mcg (1,000 units) tablet Take by mouth daily.    levocetirizine (XYZAL) 5 MG tablet Take 5 mg by mouth every evening.    albuterol 108 (90 Base) MCG/ACT inhaler Inhale 2 puffs into the lungs every 4 hours as needed for Shortness of Breath or Wheezing. (Patient not taking: Reported on 8/22/2024)    omeprazole (PRILOSEC) 20 MG capsule Take 20 mg by mouth daily.     No current facility-administered medications for this visit.       Change in Medication(s) Reported: No  If Yes, explain: N/A    Patient/Family Education Provided: Yes  Patient/Family Displays Understanding: Yes    If No, explain: N/A    Chief complaint in patient's own words: “My trip to [place] was great.”       D: Patient reported having continued symptoms of anxiety and depression related to traveling to [place] and setting boundaries with her coworkers.  Patient processed about how her grandmother's dementia has been and strategies that have worked for her grandma.    A:  Patient was provided with support. Patient's report of continued anxiety and depression was processed and reframed to build insight. Cognitions shared by patient were acknowledged and exploration was encouraged.      R:  Patient arrived on time and was dressed appropriately.  Patient was alert and oriented x4.  Patient presented as calm and cooperative. Mood appeared euthymic with congruent affect. Eye contact was normal. Speech was normal in rate, tone, and volume. Motor activity was normal. Thought process was future oriented and goal directed. Patient denies any suicidal ideation, homicidal ideation, or self-harm ideation.       P:  Provider recommended individual psychotherapy for symptoms of depression and anxiety. Patient will follow up in approximately 2 weeks or sooner if needed.  Patient will focus on using coping skills to help with mental health symptoms.    Need for Community Resources Assessed: Yes    Resources Needed: No    If Yes, what  resources: N/A    Primary Diagnosis: CHE (generalized anxiety disorder), F41.1; Recurrent major depressive disorder, in partial remission (CMD), F33.41    Treatment Plan: Unchanged    Discharge Plan: N/A     Next Appointment: 12/20/2024 Cheryl Bonds LCSW   No

## 2024-12-19 ENCOUNTER — APPOINTMENT (OUTPATIENT)
Dept: NEUROLOGY | Facility: CLINIC | Age: 29
End: 2024-12-19
Payer: MEDICAID

## 2024-12-19 VITALS
DIASTOLIC BLOOD PRESSURE: 74 MMHG | HEIGHT: 59 IN | WEIGHT: 106 LBS | HEART RATE: 105 BPM | SYSTOLIC BLOOD PRESSURE: 111 MMHG | BODY MASS INDEX: 21.37 KG/M2

## 2024-12-19 DIAGNOSIS — R44.1 VISUAL HALLUCINATIONS: ICD-10-CM

## 2024-12-19 PROCEDURE — 99203 OFFICE O/P NEW LOW 30 MIN: CPT

## 2024-12-23 ENCOUNTER — APPOINTMENT (OUTPATIENT)
Dept: CARDIOLOGY | Facility: CLINIC | Age: 29
End: 2024-12-23

## 2025-01-07 ENCOUNTER — APPOINTMENT (OUTPATIENT)
Dept: NEUROLOGY | Facility: CLINIC | Age: 30
End: 2025-01-07

## 2025-01-09 ENCOUNTER — APPOINTMENT (OUTPATIENT)
Dept: NEUROLOGY | Facility: CLINIC | Age: 30
End: 2025-01-09

## 2025-01-19 NOTE — PROGRESS NOTE BEHAVIORAL HEALTH - NSBHFUPINTERVALCCFT_PSY_A_CORE
Chart reviewed and patient interviewed.  No current thoughts or plans to harm self; admitted voluntarily with feeling she "might" harm self, without specific plan.  Pt states she "just needed a rest", and that she doesn't feel she needs medication at this time.  Pt with prior attempt to harm self by hanging, as well as interrupted attempt, where she reports having been [place] [employer] [place] years ago preparing to jump.      Pt is calm and cooperative; agrees with plan to resume outpt therapy when she feels safe for d/c
pt is calm, pleasant, and without s/h ideation.  Remains motivated for outpt tx with therapist. Doesn't feel that she needs medication.
Yes

## 2025-01-28 ENCOUNTER — APPOINTMENT (OUTPATIENT)
Dept: NEUROLOGY | Facility: CLINIC | Age: 30
End: 2025-01-28

## 2025-01-30 ENCOUNTER — APPOINTMENT (OUTPATIENT)
Dept: NEUROLOGY | Facility: CLINIC | Age: 30
End: 2025-01-30

## 2025-07-21 ENCOUNTER — NON-APPOINTMENT (OUTPATIENT)
Age: 30
End: 2025-07-21

## 2025-07-23 ENCOUNTER — APPOINTMENT (OUTPATIENT)
Dept: ORTHOPEDIC SURGERY | Facility: CLINIC | Age: 30
End: 2025-07-23

## 2025-07-24 ENCOUNTER — APPOINTMENT (OUTPATIENT)
Dept: ORTHOPEDIC SURGERY | Facility: CLINIC | Age: 30
End: 2025-07-24
Payer: MEDICAID

## 2025-07-24 DIAGNOSIS — S93.401A SPRAIN OF UNSPECIFIED LIGAMENT OF RIGHT ANKLE, INITIAL ENCOUNTER: ICD-10-CM

## 2025-07-24 PROCEDURE — 99203 OFFICE O/P NEW LOW 30 MIN: CPT | Mod: 25
